# Patient Record
Sex: FEMALE | Race: WHITE | NOT HISPANIC OR LATINO | URBAN - METROPOLITAN AREA
[De-identification: names, ages, dates, MRNs, and addresses within clinical notes are randomized per-mention and may not be internally consistent; named-entity substitution may affect disease eponyms.]

---

## 2020-05-04 DIAGNOSIS — Z01.818 PRE-OP EXAM: ICD-10-CM

## 2020-05-04 PROCEDURE — U0003 INFECTIOUS AGENT DETECTION BY NUCLEIC ACID (DNA OR RNA); SEVERE ACUTE RESPIRATORY SYNDROME CORONAVIRUS 2 (SARS-COV-2) (CORONAVIRUS DISEASE [COVID-19]), AMPLIFIED PROBE TECHNIQUE, MAKING USE OF HIGH THROUGHPUT TECHNOLOGIES AS DESCRIBED BY CMS-2020-01-R: HCPCS

## 2020-05-04 RX ORDER — NORETHINDRONE ACETATE AND ETHINYL ESTRADIOL 1MG-20(21)
1 KIT ORAL EVERY EVENING
COMMUNITY

## 2020-05-05 DIAGNOSIS — Z01.818 PRE-OP EXAM: ICD-10-CM

## 2020-05-05 LAB — SARS-COV-2 RNA SPEC QL NAA+PROBE: NOT DETECTED

## 2020-05-05 PROCEDURE — U0003 INFECTIOUS AGENT DETECTION BY NUCLEIC ACID (DNA OR RNA); SEVERE ACUTE RESPIRATORY SYNDROME CORONAVIRUS 2 (SARS-COV-2) (CORONAVIRUS DISEASE [COVID-19]), AMPLIFIED PROBE TECHNIQUE, MAKING USE OF HIGH THROUGHPUT TECHNOLOGIES AS DESCRIBED BY CMS-2020-01-R: HCPCS

## 2020-05-06 LAB — SARS-COV-2 RNA SPEC QL NAA+PROBE: NOT DETECTED

## 2020-05-11 ENCOUNTER — ANESTHESIA EVENT (OUTPATIENT)
Dept: PERIOP | Facility: HOSPITAL | Age: 30
End: 2020-05-11
Payer: SELF-PAY

## 2020-05-12 ENCOUNTER — ANESTHESIA (OUTPATIENT)
Dept: PERIOP | Facility: HOSPITAL | Age: 30
End: 2020-05-12
Payer: SELF-PAY

## 2020-05-12 ENCOUNTER — HOSPITAL ENCOUNTER (OUTPATIENT)
Facility: HOSPITAL | Age: 30
Setting detail: OUTPATIENT SURGERY
Discharge: HOME | End: 2020-05-12
Attending: SURGERY | Admitting: SURGERY
Payer: SELF-PAY

## 2020-05-12 VITALS
WEIGHT: 116.4 LBS | DIASTOLIC BLOOD PRESSURE: 73 MMHG | SYSTOLIC BLOOD PRESSURE: 116 MMHG | OXYGEN SATURATION: 98 % | BODY MASS INDEX: 21.98 KG/M2 | RESPIRATION RATE: 16 BRPM | HEIGHT: 61 IN | TEMPERATURE: 99 F | HEART RATE: 73 BPM

## 2020-05-12 DIAGNOSIS — Z01.818 PRE-OP EXAM: Primary | ICD-10-CM

## 2020-05-12 PROBLEM — N64.81 BREAST PTOSIS: Status: ACTIVE | Noted: 2020-05-12

## 2020-05-12 LAB
EXT PREGNANCY TEST URINE: NEGATIVE
EXT. CONTROL: NORMAL

## 2020-05-12 PROCEDURE — 81025 URINE PREGNANCY TEST: CPT | Performed by: ANESTHESIOLOGY

## 2020-05-12 RX ORDER — CEFAZOLIN SODIUM 1 G/50ML
1000 SOLUTION INTRAVENOUS ONCE
Status: COMPLETED | OUTPATIENT
Start: 2020-05-12 | End: 2020-05-12

## 2020-05-12 RX ORDER — MAGNESIUM HYDROXIDE 1200 MG/15ML
LIQUID ORAL AS NEEDED
Status: DISCONTINUED | OUTPATIENT
Start: 2020-05-12 | End: 2020-05-12 | Stop reason: HOSPADM

## 2020-05-12 RX ORDER — SODIUM CHLORIDE 9 MG/ML
125 INJECTION, SOLUTION INTRAVENOUS CONTINUOUS
Status: DISCONTINUED | OUTPATIENT
Start: 2020-05-12 | End: 2020-05-12 | Stop reason: HOSPADM

## 2020-05-12 RX ORDER — NEOSTIGMINE METHYLSULFATE 1 MG/ML
INJECTION INTRAVENOUS AS NEEDED
Status: DISCONTINUED | OUTPATIENT
Start: 2020-05-12 | End: 2020-05-12 | Stop reason: SURG

## 2020-05-12 RX ORDER — FENTANYL CITRATE/PF 50 MCG/ML
50 SYRINGE (ML) INJECTION
Status: DISCONTINUED | OUTPATIENT
Start: 2020-05-12 | End: 2020-05-12 | Stop reason: HOSPADM

## 2020-05-12 RX ORDER — ONDANSETRON 2 MG/ML
8 INJECTION INTRAMUSCULAR; INTRAVENOUS ONCE
Status: DISCONTINUED | OUTPATIENT
Start: 2020-05-12 | End: 2020-05-12 | Stop reason: HOSPADM

## 2020-05-12 RX ORDER — HYDROCODONE BITARTRATE AND ACETAMINOPHEN 5; 325 MG/1; MG/1
2 TABLET ORAL EVERY 4 HOURS PRN
Status: DISCONTINUED | OUTPATIENT
Start: 2020-05-12 | End: 2020-05-12 | Stop reason: HOSPADM

## 2020-05-12 RX ORDER — ONDANSETRON 2 MG/ML
4 INJECTION INTRAMUSCULAR; INTRAVENOUS ONCE AS NEEDED
Status: DISCONTINUED | OUTPATIENT
Start: 2020-05-12 | End: 2020-05-12 | Stop reason: HOSPADM

## 2020-05-12 RX ORDER — LIDOCAINE HYDROCHLORIDE 10 MG/ML
INJECTION, SOLUTION EPIDURAL; INFILTRATION; INTRACAUDAL; PERINEURAL AS NEEDED
Status: DISCONTINUED | OUTPATIENT
Start: 2020-05-12 | End: 2020-05-12 | Stop reason: SURG

## 2020-05-12 RX ORDER — BUPIVACAINE HYDROCHLORIDE AND EPINEPHRINE 2.5; 5 MG/ML; UG/ML
INJECTION, SOLUTION EPIDURAL; INFILTRATION; INTRACAUDAL; PERINEURAL AS NEEDED
Status: DISCONTINUED | OUTPATIENT
Start: 2020-05-12 | End: 2020-05-12 | Stop reason: HOSPADM

## 2020-05-12 RX ORDER — SCOLOPAMINE TRANSDERMAL SYSTEM 1 MG/1
1 PATCH, EXTENDED RELEASE TRANSDERMAL ONCE AS NEEDED
Status: DISCONTINUED | OUTPATIENT
Start: 2020-05-12 | End: 2020-05-12 | Stop reason: HOSPADM

## 2020-05-12 RX ORDER — PROPOFOL 10 MG/ML
INJECTION, EMULSION INTRAVENOUS AS NEEDED
Status: DISCONTINUED | OUTPATIENT
Start: 2020-05-12 | End: 2020-05-12 | Stop reason: SURG

## 2020-05-12 RX ORDER — ROCURONIUM BROMIDE 10 MG/ML
INJECTION, SOLUTION INTRAVENOUS AS NEEDED
Status: DISCONTINUED | OUTPATIENT
Start: 2020-05-12 | End: 2020-05-12 | Stop reason: SURG

## 2020-05-12 RX ORDER — DEXAMETHASONE SODIUM PHOSPHATE 10 MG/ML
INJECTION, SOLUTION INTRAMUSCULAR; INTRAVENOUS AS NEEDED
Status: DISCONTINUED | OUTPATIENT
Start: 2020-05-12 | End: 2020-05-12 | Stop reason: SURG

## 2020-05-12 RX ORDER — MIDAZOLAM HYDROCHLORIDE 2 MG/2ML
INJECTION, SOLUTION INTRAMUSCULAR; INTRAVENOUS AS NEEDED
Status: DISCONTINUED | OUTPATIENT
Start: 2020-05-12 | End: 2020-05-12 | Stop reason: SURG

## 2020-05-12 RX ORDER — HYDROCODONE BITARTRATE AND ACETAMINOPHEN 5; 325 MG/1; MG/1
1 TABLET ORAL EVERY 4 HOURS PRN
Status: DISCONTINUED | OUTPATIENT
Start: 2020-05-12 | End: 2020-05-12 | Stop reason: HOSPADM

## 2020-05-12 RX ORDER — ONDANSETRON 2 MG/ML
INJECTION INTRAMUSCULAR; INTRAVENOUS AS NEEDED
Status: DISCONTINUED | OUTPATIENT
Start: 2020-05-12 | End: 2020-05-12 | Stop reason: SURG

## 2020-05-12 RX ORDER — GLYCOPYRROLATE 0.2 MG/ML
INJECTION INTRAMUSCULAR; INTRAVENOUS AS NEEDED
Status: DISCONTINUED | OUTPATIENT
Start: 2020-05-12 | End: 2020-05-12 | Stop reason: SURG

## 2020-05-12 RX ORDER — FENTANYL CITRATE 50 UG/ML
INJECTION, SOLUTION INTRAMUSCULAR; INTRAVENOUS AS NEEDED
Status: DISCONTINUED | OUTPATIENT
Start: 2020-05-12 | End: 2020-05-12 | Stop reason: SURG

## 2020-05-12 RX ADMIN — LIDOCAINE HYDROCHLORIDE 60 MG: 10 INJECTION, SOLUTION EPIDURAL; INFILTRATION; INTRACAUDAL; PERINEURAL at 10:30

## 2020-05-12 RX ADMIN — PROPOFOL 200 MG: 10 INJECTION, EMULSION INTRAVENOUS at 10:30

## 2020-05-12 RX ADMIN — CEFAZOLIN SODIUM 1000 MG: 1 SOLUTION INTRAVENOUS at 10:26

## 2020-05-12 RX ADMIN — MIDAZOLAM 2 MG: 1 INJECTION INTRAMUSCULAR; INTRAVENOUS at 10:21

## 2020-05-12 RX ADMIN — ROCURONIUM BROMIDE 50 MG: 10 INJECTION, SOLUTION INTRAVENOUS at 10:30

## 2020-05-12 RX ADMIN — SODIUM CHLORIDE 125 ML/HR: 0.9 INJECTION, SOLUTION INTRAVENOUS at 08:25

## 2020-05-12 RX ADMIN — FENTANYL CITRATE 50 MCG: 50 INJECTION, SOLUTION INTRAMUSCULAR; INTRAVENOUS at 10:30

## 2020-05-12 RX ADMIN — NEOSTIGMINE METHYLSULFATE 1.5 MG: 1 INJECTION, SOLUTION INTRAVENOUS at 12:34

## 2020-05-12 RX ADMIN — FENTANYL CITRATE 50 MCG: 50 INJECTION, SOLUTION INTRAMUSCULAR; INTRAVENOUS at 11:48

## 2020-05-12 RX ADMIN — ONDANSETRON 4 MG: 2 INJECTION INTRAMUSCULAR; INTRAVENOUS at 12:37

## 2020-05-12 RX ADMIN — ONDANSETRON 4 MG: 2 INJECTION INTRAMUSCULAR; INTRAVENOUS at 10:51

## 2020-05-12 RX ADMIN — SCOPALAMINE 1 PATCH: 1 PATCH, EXTENDED RELEASE TRANSDERMAL at 08:17

## 2020-05-12 RX ADMIN — NEOSTIGMINE METHYLSULFATE 1.5 MG: 1 INJECTION, SOLUTION INTRAVENOUS at 12:39

## 2020-05-12 RX ADMIN — FENTANYL CITRATE 50 MCG: 50 INJECTION, SOLUTION INTRAMUSCULAR; INTRAVENOUS at 10:24

## 2020-05-12 RX ADMIN — HYDROCODONE BITARTRATE AND ACETAMINOPHEN 1 TABLET: 5; 325 TABLET ORAL at 14:18

## 2020-05-12 RX ADMIN — FENTANYL CITRATE 50 MCG: 50 INJECTION, SOLUTION INTRAMUSCULAR; INTRAVENOUS at 13:34

## 2020-05-12 RX ADMIN — GLYCOPYRROLATE 0.3 MG: 0.2 INJECTION INTRAMUSCULAR; INTRAVENOUS at 12:34

## 2020-05-12 RX ADMIN — FENTANYL CITRATE 50 MCG: 50 INJECTION, SOLUTION INTRAMUSCULAR; INTRAVENOUS at 13:27

## 2020-05-12 RX ADMIN — GLYCOPYRROLATE 0.3 MG: 0.2 INJECTION INTRAMUSCULAR; INTRAVENOUS at 12:39

## 2020-05-12 RX ADMIN — SODIUM CHLORIDE: 0.9 INJECTION, SOLUTION INTRAVENOUS at 12:33

## 2020-05-12 RX ADMIN — FENTANYL CITRATE 50 MCG: 50 INJECTION, SOLUTION INTRAMUSCULAR; INTRAVENOUS at 10:49

## 2020-05-12 RX ADMIN — DEXAMETHASONE SODIUM PHOSPHATE 8 MG: 10 INJECTION, SOLUTION INTRAMUSCULAR; INTRAVENOUS at 10:51

## 2021-01-18 ENCOUNTER — IMMUNIZATIONS (OUTPATIENT)
Dept: FAMILY MEDICINE CLINIC | Facility: HOSPITAL | Age: 31
End: 2021-01-18

## 2021-01-18 DIAGNOSIS — Z23 ENCOUNTER FOR IMMUNIZATION: Primary | ICD-10-CM

## 2021-01-18 PROCEDURE — 91301 SARS-COV-2 / COVID-19 MRNA VACCINE (MODERNA) 100 MCG: CPT

## 2021-01-18 PROCEDURE — 0011A SARS-COV-2 / COVID-19 MRNA VACCINE (MODERNA) 100 MCG: CPT

## 2021-01-19 ENCOUNTER — TELEPHONE (OUTPATIENT)
Dept: PEDIATRICS CLINIC | Facility: CLINIC | Age: 31
End: 2021-01-19

## 2021-02-17 ENCOUNTER — IMMUNIZATIONS (OUTPATIENT)
Dept: FAMILY MEDICINE CLINIC | Facility: HOSPITAL | Age: 31
End: 2021-02-17

## 2021-02-17 DIAGNOSIS — Z23 ENCOUNTER FOR IMMUNIZATION: Primary | ICD-10-CM

## 2021-02-17 PROCEDURE — 91301 SARS-COV-2 / COVID-19 MRNA VACCINE (MODERNA) 100 MCG: CPT

## 2021-02-17 PROCEDURE — 0012A SARS-COV-2 / COVID-19 MRNA VACCINE (MODERNA) 100 MCG: CPT

## 2021-06-28 ENCOUNTER — OFFICE VISIT (OUTPATIENT)
Dept: URGENT CARE | Facility: CLINIC | Age: 31
End: 2021-06-28
Payer: COMMERCIAL

## 2021-06-28 VITALS
TEMPERATURE: 97.8 F | HEIGHT: 61 IN | WEIGHT: 113 LBS | RESPIRATION RATE: 18 BRPM | BODY MASS INDEX: 21.34 KG/M2 | OXYGEN SATURATION: 99 % | HEART RATE: 64 BPM

## 2021-06-28 DIAGNOSIS — J06.9 VIRAL URI: Primary | ICD-10-CM

## 2021-06-28 DIAGNOSIS — R05.9 COUGH: ICD-10-CM

## 2021-06-28 PROCEDURE — 99213 OFFICE O/P EST LOW 20 MIN: CPT | Performed by: PHYSICIAN ASSISTANT

## 2021-06-28 RX ORDER — BENZONATATE 200 MG/1
200 CAPSULE ORAL 3 TIMES DAILY PRN
Qty: 20 CAPSULE | Refills: 0 | Status: SHIPPED | OUTPATIENT
Start: 2021-06-28

## 2021-06-28 RX ORDER — FLUTICASONE PROPIONATE 50 MCG
2 SPRAY, SUSPENSION (ML) NASAL DAILY
Qty: 16 G | Refills: 0 | Status: SHIPPED | OUTPATIENT
Start: 2021-06-28

## 2021-06-28 NOTE — PROGRESS NOTES
[facility] [facility]        NAME: Stan Santoro is a 33 y o  female  : 1990    MRN: [id number]  DATE: 2021  TIME: 9:14 AM    Assessment and Plan   Viral URI [J06 9]  1  Viral URI  benzonatate (TESSALON) 200 MG capsule    fluticasone (FLONASE) 50 mcg/act nasal spray   2  Cough  benzonatate (TESSALON) 200 MG capsule         Patient Instructions     Discussed condition with pt  I suspect viral URI for which I prescribed her Flonase and Tessalon Perles and rec hydration, rest, discussed OTC cough/cold meds, and observation  Should be reevaluated if condition persists/worsens  Follow up with PCP in 3-5 days  Proceed to  ER if symptoms worsen  Chief Complaint     Chief Complaint   Patient presents with    Cough     started with sore throat on friday, clearing throat alot still, started with cough over the weekend, pt is NOT in distress  no fevers or other cold symptoms, was exposed to a child with a cough, no other sick contacts, no meds at this time, pt is FULLY vaccinated         History of Present Illness       Pt presents with a 3 day history of ST, PND, cough  Denies fever, chills, congestion, N/V/D, known exposure to COVID-19, and pt is fully vaccinated  Denies asthma/allergies  Does not smoke  Review of Systems   Review of Systems   Constitutional: Negative  HENT: Positive for postnasal drip and sore throat  Negative for congestion and ear pain  Respiratory: Positive for cough  Negative for shortness of breath  Cardiovascular: Negative  Gastrointestinal: Negative  Genitourinary: Negative            Current Medications       Current Outpatient Medications:     Multiple Vitamin (MULTI-VITAMIN PO), Take by mouth, Disp: , Rfl:     norethindrone-ethinyl estradiol (JUNEL FE 1/20) 1-20 MG-MCG per tablet, Take 1 tablet by mouth every evening, Disp: , Rfl:     benzonatate (TESSALON) 200 MG capsule, Take 1 capsule (200 mg total) by mouth 3 (three) times a day as needed for cough, Disp: 20 capsule, Rfl: 0    fluticasone (FLONASE) 50 mcg/act nasal spray, 2 sprays into each nostril daily, Disp: 16 g, Rfl: 0    Current Allergies     Allergies as of 2021 - Reviewed 2021   Allergen Reaction Noted    Bactrim [sulfamethoxazole-trimethoprim] Hives 2020            The following portions of the patient's history were reviewed and updated as appropriate: allergies, current medications, past family history, past medical history, past social history, past surgical history and problem list      Past Medical History:   Diagnosis Date    Exercises 5 to 6 times per week     PONV (postoperative nausea and vomiting)     with C Sections//and dizziness    Wears glasses     and contacts       Past Surgical History:   Procedure Laterality Date     SECTION      x2    UT SUSPENSION OF BREAST Bilateral 2020    Procedure: BREAST MASTOPEXY;  Surgeon: Meka Waller MD;  Location: [facility];  Service: Plastics    WISDOM TOOTH EXTRACTION         History reviewed  No pertinent family history  Medications have been verified  Objective   Pulse 64   Temp 97 8 °F (36 6 °C) (Tympanic)   Resp 18   Ht 5' 1" (1 549 m)   Wt 51 3 kg (113 lb)   SpO2 99%   BMI 21 35 kg/m²   No LMP recorded  Physical Exam     Physical Exam  Vitals reviewed  Constitutional:       General: She is not in acute distress  Appearance: She is well-developed  HENT:      Right Ear: Hearing, tympanic membrane, ear canal and external ear normal       Left Ear: Hearing, tympanic membrane, ear canal and external ear normal       Nose: Nose normal       Mouth/Throat:      Mouth: Mucous membranes are moist       Pharynx: Posterior oropharyngeal erythema (PND) present  No oropharyngeal exudate  Tonsils: No tonsillar exudate  Cardiovascular:      Rate and Rhythm: Normal rate and regular rhythm  Pulses: Normal pulses  Heart sounds: Normal heart sounds  No murmur heard  Pulmonary:      Effort: Pulmonary effort is normal  No respiratory distress  Breath sounds: Normal breath sounds  Musculoskeletal:      Cervical back: Neck supple  Lymphadenopathy:      Cervical: No cervical adenopathy  Neurological:      Mental Status: She is alert and oriented to person, place, and time

## 2021-06-28 NOTE — PATIENT INSTRUCTIONS
Upper Respiratory Infection   WHAT YOU NEED TO KNOW:   An upper respiratory infection is also called a cold  It can affect your nose, throat, ears, and sinuses  Cold symptoms are usually worst for the first 3 to 5 days  Most people get better in 7 to 14 days  You may continue to cough for 2 to 3 weeks  Colds are caused by viruses and do not get better with antibiotics  DISCHARGE INSTRUCTIONS:   Call your local emergency number (911 in the [address] [facility] [address]) if:   · You have chest pain or trouble breathing  Return to the emergency department if:   · You have a fever over 102ºF (39ºC)  Call your doctor if:   · You have a low fever  · Your sore throat gets worse or you see white or yellow spots in your throat  · Your symptoms get worse after 3 to 5 days or are not better in 14 days  · You have a rash anywhere on your skin  · You have large, tender lumps in your neck  · You have thick, green, or yellow drainage from your nose  · You cough up thick yellow, green, or bloody mucus  · You have a bad earache  · You have questions or concerns about your condition or care  Medicines: You may need any of the following:  · Decongestants  help reduce nasal congestion and help you breathe more easily  If you take decongestant pills, they may make you feel restless or cause problems with your sleep  Do not use decongestant sprays for more than a few days  · Cough suppressants  help reduce coughing  Ask your healthcare provider which type of cough medicine is best for you  · NSAIDs , such as ibuprofen, help decrease swelling, pain, and fever  NSAIDs can cause stomach bleeding or kidney problems in certain people  If you take blood thinner medicine, always ask your healthcare provider if NSAIDs are safe for you  Always read the medicine label and follow directions  · Acetaminophen  decreases pain and fever  It is available without a doctor's order  Ask how much to take and how often to take it  Follow directions  Read the labels of all other medicines you are using to see if they also contain acetaminophen, or ask your doctor or pharmacist  Acetaminophen can cause liver damage if not taken correctly  Do not use more than 4 grams (4,000 milligrams) total of acetaminophen in one day  · Take your medicine as directed  Contact your healthcare provider if you think your medicine is not helping or if you have side effects  Tell him or her if you are allergic to any medicine  Keep a list of the medicines, vitamins, and herbs you take  Include the amounts, and when and why you take them  Bring the list or the pill bottles to follow-up visits  Carry your medicine list with you in case of an emergency  Self-care:   · Rest as much as possible  Slowly start to do more each day  · Drink more liquids as directed  Liquids will help thin and loosen mucus so you can cough it up  Liquids will also help prevent dehydration  Liquids that help prevent dehydration include water, fruit juice, and broth  Do not drink liquids that contain caffeine  Caffeine can increase your risk for dehydration  Ask your healthcare provider how much liquid to drink each day  · Soothe a sore throat  Gargle with warm salt water  Make salt water by dissolving ¼ teaspoon salt in 1 cup warm water  You may also suck on hard candy or throat lozenges  You may use a sore throat spray  · Use a humidifier or vaporizer  Use a cool mist humidifier or a vaporizer to increase air moisture in your home  This may make it easier for you to breathe and help decrease your cough  · Use saline nasal drops as directed  These help relieve congestion  · Apply petroleum-based jelly around the outside of your nostrils  This can decrease irritation from blowing your nose  · Do not smoke  Nicotine and other chemicals in cigarettes and cigars can make your symptoms worse  They can also cause infections such as bronchitis or pneumonia   Ask your healthcare provider for information if you currently smoke and need help to quit  E-cigarettes or smokeless tobacco still contain nicotine  Talk to your healthcare provider before you use these products  Prevent a cold:   · Wash your hands often  Use soap and water every time you wash your hands  Rub your soapy hands together, lacing your fingers  Use the fingers of one hand to scrub under the nails of the other hand  Wash for at least 20 seconds  Rinse with warm, running water for several seconds  Then dry your hands  Use germ-killing gel if soap and water are not available  Do not touch your eyes or mouth without washing your hands first          · Cover a sneeze or cough  Use a tissue that covers your mouth and nose  Put the used tissue in the trash right away  Use the bend of your arm if a tissue is not available  Wash your hands well with soap and water or use a hand   Do not stand close to anyone who is sneezing or coughing  · Try to stay away from others while you are sick  This is especially important during the first 2 to 3 days when the virus is more easily spread  Wait until a fever, cough, or other symptoms are gone before you return to work or other regular activities  · Do not share items while you are sick  This includes food, drinks, eating utensils, and dishes  Follow up with your doctor as directed:  Write down your questions so you remember to ask them during your visits  © Copyright [address] Information is for End User's use only and may not be sold, redistributed or otherwise used for commercial purposes  All illustrations and images included in CareNotes® are the copyrighted property of A D A M , Inc  or [facility] Katja Card   The above information is an  only  It is not intended as medical advice for individual conditions or treatments   Talk to your doctor, nurse or pharmacist before following any medical regimen to see if it is safe and effective for you

## 2024-09-09 ENCOUNTER — TELEPHONE (OUTPATIENT)
Dept: OBGYN CLINIC | Facility: MEDICAL CENTER | Age: 34
End: 2024-09-09

## 2024-09-09 NOTE — TELEPHONE ENCOUNTER
Patient called back and is seeing Dr. Mathew for rt shoulder and no imaging done yet - added to notes

## 2024-09-09 NOTE — TELEPHONE ENCOUNTER
Left message stating to please call the office with the reason for the visit (left or right shoulder) , please bring any imaging and report along to the visit. Left office number for Pt to return call.

## 2024-09-10 ENCOUNTER — OFFICE VISIT (OUTPATIENT)
Dept: OBGYN CLINIC | Facility: MEDICAL CENTER | Age: 34
End: 2024-09-10
Payer: COMMERCIAL

## 2024-09-10 ENCOUNTER — APPOINTMENT (OUTPATIENT)
Dept: RADIOLOGY | Facility: MEDICAL CENTER | Age: 34
End: 2024-09-10
Payer: COMMERCIAL

## 2024-09-10 VITALS
SYSTOLIC BLOOD PRESSURE: 109 MMHG | WEIGHT: 112 LBS | DIASTOLIC BLOOD PRESSURE: 73 MMHG | HEIGHT: 61 IN | BODY MASS INDEX: 21.14 KG/M2 | HEART RATE: 61 BPM

## 2024-09-10 DIAGNOSIS — S43.431A LABRAL TEAR OF SHOULDER, RIGHT, INITIAL ENCOUNTER: ICD-10-CM

## 2024-09-10 DIAGNOSIS — M75.41 SHOULDER IMPINGEMENT SYNDROME, RIGHT: Primary | ICD-10-CM

## 2024-09-10 DIAGNOSIS — M25.511 RIGHT SHOULDER PAIN, UNSPECIFIED CHRONICITY: ICD-10-CM

## 2024-09-10 PROCEDURE — 99203 OFFICE O/P NEW LOW 30 MIN: CPT | Performed by: ORTHOPAEDIC SURGERY

## 2024-09-10 PROCEDURE — 73030 X-RAY EXAM OF SHOULDER: CPT

## 2024-09-10 NOTE — PROGRESS NOTES
Ortho Sports Medicine Shoulder New Patient Visit     Assesment:   34 y.o. female right shoulder possible labral tear with shoulder impingement     Plan:    Conservative treatment:    Ice to shoulder 1-2 times daily, for 20 minutes at a time.  PT for ROM and strengthening to shoulder, rotator cuff, scapular stabilizers.      Imaging:    We will obtain an MRI arthrogram of the shoulder to rule out labral vs small cuff tear.      Injection:    No Injection planned at this time.      Surgery:     No surgery is recommended at this point, continue with conservative treatment plan as noted.      Follow up:    No follow-ups on file.        Chief Complaint   Patient presents with    Right Shoulder - Pain       History of Present Illness:    The patient is a 34 y.o., right hand dominant female whose occupation is unknown, referred to me by their primary care physician, seen in clinic for consultation of right shoulder pain.      The patient denies a history of diabetes.  The patient denies a history of thyroid disorder.      Pain is located posterior, lateral.  The patient rates the pain as a 8/10.  The pain has been present for 6 months.      The patient denies specific right shoulder injury but states that she has been noticed some intermittent lateral aspect right shoulder pain since March.  Patient states that she does  her daughters field hockey/softball team and does state that she was throwing a ball around and did feel some pain after that time.  Patient states that she was able to nurse the shoulder back in she was doing well.  Patient reports that after that time she had full range of motion and strength.  Patient reports that then in May 2024 she was throwing a waterlogged softball and when she felt a pop within the shoulder.  Patient reports that after that moment she was able to range the shoulder but states that she has had pain ever since.  Patient reports all of her pain is deep within the shoulder but it  does radiate to the lateral and posterior aspect of the shoulder.  Patient also notes intermittent clicking within the shoulder with ROM since the injury. She also notes pain at end that will wake her form sleep.  The pain is characterized as sharp, stabbing, dull, achy.  The pain is present at all times.      Pain is improved by rest.  Pain is aggravated by overhead activity, reaching back, rotation, lifting , and exercising.    Symptoms include clicking and catching.    The patient denies weakness.  The patient denies numbness and tingling.     The patient has tried rest, ice, and NSAIDS.          Shoulder Surgical History:  None    Past Medical, Social and Family History:  Past Medical History:   Diagnosis Date    Exercises 5 to 6 times per week     PONV (postoperative nausea and vomiting)     with C Sections//and dizziness    Wears glasses     and contacts     Past Surgical History:   Procedure Laterality Date     SECTION      x2    VT MASTOPEXY Bilateral 2020    Procedure: BREAST MASTOPEXY;  Surgeon: Kashif Grande MD;  Location: [facility];  Service: Plastics    WISDOM TOOTH EXTRACTION       Allergies   Allergen Reactions    Bactrim [Sulfamethoxazole-Trimethoprim] Hives     Current Outpatient Medications on File Prior to Visit   Medication Sig Dispense Refill    Multiple Vitamin (MULTI-VITAMIN PO) Take by mouth      benzonatate (TESSALON) 200 MG capsule Take 1 capsule (200 mg total) by mouth 3 (three) times a day as needed for cough (Patient not taking: Reported on 9/10/2024) 20 capsule 0    fluticasone (FLONASE) 50 mcg/act nasal spray 2 sprays into each nostril daily (Patient not taking: Reported on 9/10/2024) 16 g 0    norethindrone-ethinyl estradiol (JUNEL FE 1/20) 1-20 MG-MCG per tablet Take 1 tablet by mouth every evening (Patient not taking: Reported on 9/10/2024)       No current facility-administered medications on file prior to visit.     Social History     Socioeconomic History    Marital  "status: /Civil Union     Spouse name: Not on file    Number of children: Not on file    Years of education: Not on file    Highest education level: Not on file   Occupational History    Not on file   Tobacco Use    Smoking status: Never    Smokeless tobacco: Never   Vaping Use    Vaping status: Never Used   Substance and Sexual Activity    Alcohol use: Yes     Comment: minimal    Drug use: Never    Sexual activity: Not on file   Other Topics Concern    Not on file   Social History Narrative    Not on file     Social Determinants of Health     Financial Resource Strain: Not on file   Food Insecurity: Not on file   Transportation Needs: Not on file   Physical Activity: Not on file   Stress: Not on file   Social Connections: Not on file   Intimate Partner Violence: Not on file   Housing Stability: Not on file         I have reviewed the past medical, surgical, social and family history, medications and allergies as documented in the EMR.    Review of systems: ROS is negative other than that noted in the HPI.  Constitutional: Negative for fatigue and fever.   HENT: Negative for sore throat.    Respiratory: Negative for shortness of breath.    Cardiovascular: Negative for chest pain.   Gastrointestinal: Negative for abdominal pain.   Endocrine: Negative for cold intolerance and heat intolerance.   Genitourinary: Negative for flank pain.   Musculoskeletal: Negative for back pain.   Skin: Negative for rash.   Allergic/Immunologic: Negative for immunocompromised state.   Neurological: Negative for dizziness.   Psychiatric/Behavioral: Negative for agitation.      Physical Exam:    Blood pressure 109/73, pulse 61, height 5' 1\" (1.549 m), weight 50.8 kg (112 lb), not currently breastfeeding.    General/Constitutional: NAD, well developed, well nourished  HENT: Normocephalic, atraumatic  CV: Intact distal pulses, regular rate  Resp: No respiratory distress or labored breathing  GI: Soft and non-tender   Lymphatic: No " lymphadenopathy palpated  Neuro: Alert and Oriented x 3, no focal deficits  Psych: Normal mood, normal affect, normal judgement, normal behavior  Skin: Warm, dry, no rashes, no erythema      Shoulder focused exam:       RIGHT LEFT    Scapula Atrophy Negative Negative     Winging Negative Negative     Protraction Negative Negative    Rotator cuff SS 5/5 5/5     IS 5/5 5/5     SubS 5/5 5/5    ROM     170     ER0 60 60                   IRb T6    T6    TTP: AC Negative Negative     Biceps Positive Negative     Coracoid Negative Negative    Special Tests: O'Briens Positive Negative     Lazo-shear Positive Negative     Cross body Adduction Negative Negative     Speeds  Negative Negative     Alfred's Negative Negative     Whipple Negative Negative       Neer Negative Negative     Amor Negative Negative    Instability: Apprehension & relocation not tested not tested     Load & shift not tested not tested    Other: Crank Negative Negative                 UE NV Exam: +2 Radial pulses bilaterally  Sensation intact to light touch C5-T1 bilaterally, Radial/median/ulnar nerve motor intact      Bilateral elbow, wrist, and and forearm ROM full, painless with passive ROM, no ttp or crepitance throughout extremities below shoulder joint    Cervical ROM is full without pain, numbness or tingling      Shoulder Imaging    X-rays of the right shoulder were reviewed, which demonstrate mild AC joint OA.  I have reviewed the radiology report and do not currently have a radiology reading from [facility], but will check the result once the reading is performed.

## 2024-09-16 ENCOUNTER — EVALUATION (OUTPATIENT)
Dept: PHYSICAL THERAPY | Facility: CLINIC | Age: 34
End: 2024-09-16
Payer: COMMERCIAL

## 2024-09-16 DIAGNOSIS — M75.41 SHOULDER IMPINGEMENT SYNDROME, RIGHT: ICD-10-CM

## 2024-09-16 PROCEDURE — 97110 THERAPEUTIC EXERCISES: CPT | Performed by: PHYSICAL THERAPIST

## 2024-09-16 PROCEDURE — 97161 PT EVAL LOW COMPLEX 20 MIN: CPT | Performed by: PHYSICAL THERAPIST

## 2024-09-16 NOTE — PROGRESS NOTES
"PT Evaluation     Today's date: 2024  Patient name: Isha Jacobo  : 1990  MRN: [id number]  Referring provider: Linsey Mims,*  Dx:   Encounter Diagnosis     ICD-10-CM    1. Shoulder impingement syndrome, right  M75.41 Ambulatory Referral to Physical Therapy                     Assessment  Functional limitations: limited overhead lifting, interrupted sleep    Assessment details: Isha Jacobo is a 33 yo female with right posterior shoulder pain that she describes as \"deep inside the joint\" worsening after throwing a water logged ball several months ago. She presents with decreased strength, pain at end range motion, and the listed functional limitations. She is scheduled for an arthrogram on 10/9/24 to rule out suspected labral tear. She is a good candidate for OPPT to address above impairments and optimize functional status.     Goals  6 weeks  1. Independent with HEP at discharge  2. Tolerate sleeping 6-8 hours uninterrupted by pain to allow resuming PLOF  3. Normalize R shoulder strength throughout to allow full functional use of RUE.  4. Achieve full pain free AROM R shoulder at all planes to allow full functional use of RUE.          Plan  Patient would benefit from: skilled physical therapy and PT eval  Other planned modality interventions: EPAT    Planned therapy interventions: manual therapy, neuromuscular re-education, patient/caregiver education, therapeutic activities, therapeutic exercise and home exercise program    Frequency: 1x week  Duration in weeks: 6  Treatment plan discussed with: patient  Plan details: Provided with and reviewed initial written HEP.  Reviewed physical exam findings and plan of care.  All questions answered to patient's satisfaction.          Subjective Evaluation    History of Present Illness  Mechanism of injury: Patient has been having right shoulder pain for several months after throwing a water logged ball over memorial day weekend. She felt immediate " pain. Has been popping a lot over the last few weeks. Currently limiting overhead lifting when working out. Saw Dr. Mathew in ortho, referred to OPPT. Suspected labral tear and impingement syndrome.   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     Cervical/Thoracic Screen   Cervical range of motion within normal limits  Thoracic range of motion within normal limits with the following exceptions: Increased thoracic kyphosis    Active Range of Motion     Right Shoulder   Flexion: WFL and with pain  Abduction: WFL and with pain  External rotation BTH: WFL  Internal rotation BTB: WFL and with pain    Passive Range of Motion     Right Shoulder   Flexion: 165 degrees with pain  Abduction: 155 degrees with pain  External rotation 90°: WFL  Internal rotation 90°: 60 degrees     Joint Play     Right Shoulder  Joints within functional limits are the anterior capsule, posterior capsule and inferior capsule. Hypomobile in the thoracic spine.     Strength/Myotome Testing     Right Shoulder     Planes of Motion   External rotation at 0°: 4   External rotation at 90°: 4+   Internal rotation at 0°: 4     Isolated Muscles   Lower trapezius: 3+   Middle trapezius: 4+   Serratus anterior: 5     Tests     Right Shoulder   Positive empty can, Hawkin's and Neer's.   Negative Speed's.     HEP provided as follows:  Access Code: [email or web address]  URL: [email or web address]  Date: 2024  Prepared by: Jessica Carranza    Exercises  - Standing Shoulder Internal Rotation Stretch with Towel  - 1 x daily - 7 x weekly - 3 reps - 30 hold  -  Thoracic Extension Mobilization on Foam Roll  - 1 x daily - 7 x weekly - 10 reps - 10 hold  - Seated Cervical and Thoracic Extension Stretch  - 1 x daily - 7 x weekly - 10 reps - 10 hold  - Thoracic Cervical Extension on Swiss Ball  - 1 x daily - 7 x weekly - 10 reps - 10 hold  - Shoulder External Rotation and Scapular Retraction with Resistance  - 1 x daily - 7 x weekly - 3 sets - 10 reps  - Standing Single Arm Shoulder External Rotation in Abduction with Anchored Resistance  - 1 x daily - 7 x weekly - 3 sets - 10 reps  - Standing Lat Pull Down with Resistance - Elbows Bent  - 1 x daily - 7 x weekly - 3 sets - 10 reps  - Prone Scapular Retraction Y  - 1 x daily - 7 x weekly - 3 sets - 10 reps  - Prone W Scapular Retraction  - 1 x daily - 7 x weekly - 3 sets - 10 reps         Precautions: arthrogram scheduled for 10/9/24      Manuals 9/16            EPAT R post shld DI15 3.0 barr 15Hz #1                                                   Neuro Re-Ed                                                                                                        Ther Ex             B ER tband             Lat pull down tband             ER at 90 tband             Towel IR str             T/S ext over foam roller             Prone WT                          Pt ed HEP rev KT            Ther Activity                                       Gait Training                                       Modalities

## 2024-09-19 ENCOUNTER — APPOINTMENT (OUTPATIENT)
Dept: PHYSICAL THERAPY | Facility: CLINIC | Age: 34
End: 2024-09-19
Payer: COMMERCIAL

## 2024-09-23 ENCOUNTER — OFFICE VISIT (OUTPATIENT)
Dept: PHYSICAL THERAPY | Facility: CLINIC | Age: 34
End: 2024-09-23
Payer: COMMERCIAL

## 2024-09-23 DIAGNOSIS — M75.41 SHOULDER IMPINGEMENT SYNDROME, RIGHT: Primary | ICD-10-CM

## 2024-09-23 PROCEDURE — 97110 THERAPEUTIC EXERCISES: CPT | Performed by: PHYSICAL THERAPIST

## 2024-09-23 PROCEDURE — 97140 MANUAL THERAPY 1/> REGIONS: CPT | Performed by: PHYSICAL THERAPIST

## 2024-09-23 NOTE — PROGRESS NOTES
"Daily Note     Today's date: 2024  Patient name: Isha Jacobo  : 1990  MRN: [id number]  Referring provider: Linsey Mims,*  Dx:   Encounter Diagnosis     ICD-10-CM    1. Shoulder impingement syndrome, right  M75.41                      Subjective: Pt reports feeling looser with the exercises but does notice the towel IR stretch is painful.       Objective: See treatment diary below      Assessment: Tolerated treatment well. Patient exhibited good technique with therapeutic exercises and would benefit from continued PT. Recommended pt avoid cold pack or NSAIDs after EPAT treatment to avoid diminished benefit.       Plan: Continue per plan of care.      Precautions: arthrogram scheduled for 10/9/24      Manuals            EPAT R post shld DI15 3.0 barr 15Hz #1 3.5-4.0 barr #2                                                  Neuro Re-Ed                                                                                                        Ther Ex             B ER tband  2x15 blue           Lat pull down tband  2x15 black           ER at 90 tband  2x15 blue           Towel IR str             T/S ext over foam roller  10\"x10           Prone WT  2x15 ea           Sleeper str  30\"x3           Pt ed HEP rev KT            Ther Activity                                       Gait Training                                       Modalities                                            "

## 2024-09-30 ENCOUNTER — OFFICE VISIT (OUTPATIENT)
Dept: PHYSICAL THERAPY | Facility: CLINIC | Age: 34
End: 2024-09-30
Payer: COMMERCIAL

## 2024-09-30 DIAGNOSIS — M75.41 SHOULDER IMPINGEMENT SYNDROME, RIGHT: Primary | ICD-10-CM

## 2024-09-30 PROCEDURE — 97110 THERAPEUTIC EXERCISES: CPT | Performed by: PHYSICAL THERAPIST

## 2024-09-30 NOTE — PROGRESS NOTES
"Daily Note     Today's date: 2024  Patient name: Isha Jacobo  : 1990  MRN: [id number]  Referring provider: Linsey Mims,*  Dx:   Encounter Diagnosis     ICD-10-CM    1. Shoulder impingement syndrome, right  M75.41                      Subjective: Pt reports she's feeling a little crampy today in her shoulder. Feels the sidesleeper IR stretch is more tolerable than the towel ir stretch. Still having catching and popping.       Objective: See treatment diary below      Assessment: Tolerated treatment well. Patient exhibited good technique with therapeutic exercises      Plan: Continue per plan of care.      Precautions: arthrogram scheduled for 10/9/24      Manuals           EPAT R post shld DI15 3.0 barr 15Hz #1 3.5-4.0 barr #2 3.5 barr #3                                                  Neuro Re-Ed                                                                                                        Ther Ex             B ER tband  2x15 blue 2x15 blue           Lat pull down tband  2x15 black 2x15 black          ER at 90 tband  2x15 blue 2x15 blue          Towel IR str             T/S ext over foam roller  10\"x10 10\"x10          Prone WT  2x15 ea 2x15 ea          Sleeper str  30\"x3 30\"x3          Pt ed HEP rev KT            Ther Activity                                       Gait Training                                       Modalities                                              "

## 2024-10-07 ENCOUNTER — TELEPHONE (OUTPATIENT)
Dept: RADIOLOGY | Facility: HOSPITAL | Age: 34
End: 2024-10-07

## 2024-10-07 ENCOUNTER — OFFICE VISIT (OUTPATIENT)
Dept: PHYSICAL THERAPY | Facility: CLINIC | Age: 34
End: 2024-10-07
Payer: COMMERCIAL

## 2024-10-07 DIAGNOSIS — M75.41 SHOULDER IMPINGEMENT SYNDROME, RIGHT: Primary | ICD-10-CM

## 2024-10-07 PROCEDURE — 97140 MANUAL THERAPY 1/> REGIONS: CPT | Performed by: PHYSICAL THERAPIST

## 2024-10-07 PROCEDURE — 97110 THERAPEUTIC EXERCISES: CPT | Performed by: PHYSICAL THERAPIST

## 2024-10-07 NOTE — PROGRESS NOTES
"Daily Note     Today's date: 10/7/2024  Patient name: Isha Jacobo  : 1990  MRN: [id number]  Referring provider: Linsey Mims,*  Dx:   Encounter Diagnosis     ICD-10-CM    1. Shoulder impingement syndrome, right  M75.41                      Subjective: Pt reports she's feeling sore today. Doesn't notice any significant improvement with PT. Has arthrogram scheduled in 2 days.       Objective: See treatment diary below      Assessment: Tolerated treatment well. Patient exhibited good technique with therapeutic exercises      Plan:  Hold PT pending arthrogram results and follow up with ortho     Precautions: arthrogram scheduled for 10/9/24      Manuals 9/16 9/23 9/30 10/7         EPAT R post shld DI15 3.0 barr 15Hz #1 3.5-4.0 barr #2 3.5 barr #3  3.5 barr #4 2500 pulses                                                Neuro Re-Ed                                                                                                        Ther Ex             B ER tband  2x15 blue 2x15 blue  2x15 blue         Lat pull down tband  2x15 black 2x15 black 2x15 black         ER at 90 tband  2x15 blue 2x15 blue 2x15 blue         Towel IR str             T/S ext over foam roller  10\"x10 10\"x10 10\"x10         Prone WT  2x15 ea 2x15 ea 2x15 ea         Sleeper str  30\"x3 30\"x3 30\"x3         Pt ed HEP rev KT            Ther Activity                                       Gait Training                                       Modalities                                                "

## 2024-10-07 NOTE — NURSING NOTE
Call placed to pt to discuss upcoming appointment at [facility] Radiology Department.  No answer.  Information left on pts voicemail.  Pt is having a R Shoulder Arthrogram completed on 10/9/2024.  Pre procedure instructions given including diet and taking own medications.  Instructed pt that she may eat normally and take medications as usual before the procedure.  Reminded pt of the location, date and time of procedure.  Phone number given for pt to call with any questions.

## 2024-10-09 ENCOUNTER — HOSPITAL ENCOUNTER (OUTPATIENT)
Dept: MRI IMAGING | Facility: HOSPITAL | Age: 34
Discharge: HOME | End: 2024-10-09
Payer: COMMERCIAL

## 2024-10-09 ENCOUNTER — HOSPITAL ENCOUNTER (OUTPATIENT)
Dept: RADIOLOGY | Facility: HOSPITAL | Age: 34
Discharge: HOME | End: 2024-10-09
Payer: COMMERCIAL

## 2024-10-09 DIAGNOSIS — M75.41 SHOULDER IMPINGEMENT SYNDROME, RIGHT: ICD-10-CM

## 2024-10-09 DIAGNOSIS — S43.431A LABRAL TEAR OF SHOULDER, RIGHT, INITIAL ENCOUNTER: ICD-10-CM

## 2024-10-09 PROCEDURE — 73222 MRI JOINT UPR EXTREM W/DYE: CPT

## 2024-10-09 PROCEDURE — 77002 NEEDLE LOCALIZATION BY XRAY: CPT

## 2024-10-09 PROCEDURE — 23350 INJECTION FOR SHOULDER X-RAY: CPT

## 2024-10-09 PROCEDURE — A9585 GADOBUTROL INJECTION: HCPCS | Performed by: PHYSICIAN ASSISTANT

## 2024-10-09 RX ORDER — GADOBUTROL 604.72 MG/ML
0.2 INJECTION INTRAVENOUS
Status: COMPLETED | OUTPATIENT
Start: 2024-10-09 | End: 2024-10-09

## 2024-10-09 RX ORDER — ROPIVACAINE HYDROCHLORIDE 2 MG/ML
2 INJECTION, SOLUTION EPIDURAL; INFILTRATION; PERINEURAL
Status: DISCONTINUED | OUTPATIENT
Start: 2024-10-09 | End: 2024-10-10 | Stop reason: HOSPADM

## 2024-10-09 RX ADMIN — ROPIVACAINE HYDROCHLORIDE 2 ML: 2 INJECTION EPIDURAL; INFILTRATION; PERINEURAL at 14:07

## 2024-10-09 RX ADMIN — GADOBUTROL 0.2 ML: 604.72 INJECTION INTRAVENOUS at 14:06

## 2024-10-09 RX ADMIN — IOHEXOL 1 ML: 300 INJECTION, SOLUTION INTRAVENOUS at 14:06

## 2024-10-14 ENCOUNTER — APPOINTMENT (OUTPATIENT)
Dept: PHYSICAL THERAPY | Facility: CLINIC | Age: 34
End: 2024-10-14
Payer: COMMERCIAL

## 2024-10-25 ENCOUNTER — OFFICE VISIT (OUTPATIENT)
Dept: OBGYN CLINIC | Facility: CLINIC | Age: 34
End: 2024-10-25
Payer: COMMERCIAL

## 2024-10-25 VITALS
HEART RATE: 69 BPM | HEIGHT: 61 IN | WEIGHT: 110 LBS | SYSTOLIC BLOOD PRESSURE: 114 MMHG | BODY MASS INDEX: 20.77 KG/M2 | DIASTOLIC BLOOD PRESSURE: 75 MMHG

## 2024-10-25 DIAGNOSIS — S43.431A SUPERIOR GLENOID LABRUM LESION OF RIGHT SHOULDER, INITIAL ENCOUNTER: Primary | ICD-10-CM

## 2024-10-25 PROCEDURE — 99214 OFFICE O/P EST MOD 30 MIN: CPT | Performed by: STUDENT IN AN ORGANIZED HEALTH CARE EDUCATION/TRAINING PROGRAM

## 2024-10-25 RX ORDER — CHLORHEXIDINE GLUCONATE ORAL RINSE 1.2 MG/ML
15 SOLUTION DENTAL ONCE
OUTPATIENT
Start: 2024-10-25 | End: 2024-10-25

## 2024-10-25 NOTE — PROGRESS NOTES
Ortho Sports Medicine Shoulder New Patient Visit     Assesment:   34 y.o. female right shoulder SLAP tear, impingement syndrome    Plan:  Patient presents with 7 months of shoulder pain with some antecedent pain in the shoulder as well over the last several years.  She has symptomatic type II SLAP tear that has been not improving with 7 months of activity modification, rest, home directed exercise program, as well as formal physical therapy.  More recently she has been unable unable to tolerate formal physical therapy due to extent of her pain.  We discussed the pathophysiology of SLAP tears especially in a .  I recommended the possibility of ongoing nonoperative treatment with ultrasound-guided glenohumeral corticosteroid injection versus surgical intervention in the form of shoulder arthroscopy and labral repair, versus biceps tenodesis, and all associate procedures.  Patient is interested in proceeding surgical intervention as she is frustrated with her lack of improvement over the last several months and she worries a injection will be short-lived relief.  We mutually agreed to move forward with surgical intervention after risk-benefit and alternatives were discussed at length.      Given that the patient has failed conservative treatment and continues to have severe pain and/or dysfunction that limits their activities of daily living, they would like to proceed with operative intervention. We discussed with the patient the risks of no treatment, non-operative treatment, and operative treatment. The risks of operative intervention were discussed and include but are not limited to: Infection, bleeding, stiffness, loss of range of motion, blood clot, failure of surgery, fracture, delayed union, nonunion, malunion, risk of potential future arthritis, continued problems with swelling, injury to surrounding structures/nerve/artery/vein, recurrence of cysts, failure of hardware, retained hardware and/or  foreign body, symptomatic hardware, and continued instability, pain, dysfunction, or disability despite repair.       Conservative treatment:    Discussed with the patient that it's reasonable to try a localized cortisone injection via US with a dedicated course of PT. Patient wished to proceed with surgical intervention due to failing non operative treatments that she has completed over the last 7 months with continued pain.    Imaging:    All imaging from today was reviewed by myself and explained to the patient.       Injection:    No Injection planned at this time.      Surgery:     All of the risks and benefits of operative treatment were explained to the patient, as well as the risks and benefits of any alternative treatment options, including nonoperative care. This risks of surgery include, but are not limited to, infection, bleeding, blood clot, damage to nerves/arteries, need for further surgyer, cardiovascular risk, anesthesia risk, and continued pain.  The patient understood this and elects to proceed forward with surgical intervention.  We will proceed forward with surgical arthroscopy of the shoulder with SLAP repair with biceps tenodesis with all other associated procedures      Follow up:    Return for Follow up post op.        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      The patient denies a history of diabetes.  The patient denies a history of thyroid disorder.      Pain is located posterior, lateral.  The patient rates the pain as a 8/10.  The pain has been present for 8 months.      The patient denies specific right shoulder injury but states that she has been noticed some intermittent lateral aspect right shoulder pain  since March.  Patient states that she does  her daughters field hockey/softball team and does state that she was throwing a ball around and did feel some pain after that time.  Patient states that she was able to nurse the shoulder back in she was doing well.  Patient reports that after that time she had full range of motion and strength.  Patient reports that then in May 2024 she was throwing a waterlogged softball and when she felt a pop within the shoulder.  Patient reports that after that moment she was able to range the shoulder but states that she has had pain ever since.  Patient reports all of her pain is deep within the shoulder but it does radiate to the lateral and posterior aspect of the shoulder.  Patient also notes intermittent clicking within the shoulder with ROM since the injury. She also notes pain at end that will wake her form sleep.  The pain is characterized as sharp, stabbing, dull, achy.  The pain is present at all times.     Pain is improved by rest.  Pain is aggravated by overhead activity, reaching back, rotation, lifting , and exercising.    Symptoms include clicking and catching.    The patient denies weakness.  The patient denies numbness and tingling.     The patient has tried rest, ice, NSAIDS, and physical therapy.          Shoulder Surgical History:  None    Past Medical, Social and Family History:  Past Medical History:   Diagnosis Date    Exercises 5 to 6 times per week     PONV (postoperative nausea and vomiting)     with C Sections//and dizziness    Wears glasses     and contacts     Past Surgical History:   Procedure Laterality Date     SECTION      x2    FL INJECTION RIGHT SHOULDER (ARTHROGRAM)  10/9/2024    SC MASTOPEXY Bilateral 2020    Procedure: BREAST MASTOPEXY;  Surgeon: Kashif Grande MD;  Location: [facility];  Service: Plastics    WISDOM TOOTH EXTRACTION       Allergies   Allergen Reactions    Bactrim [Sulfamethoxazole-Trimethoprim] Hives     Current  Outpatient Medications on File Prior to Visit   Medication Sig Dispense Refill    Multiple Vitamin (MULTI-VITAMIN PO) Take by mouth      fluticasone (FLONASE) 50 mcg/act nasal spray 2 sprays into each nostril daily (Patient not taking: Reported on 9/10/2024) 16 g 0    [DISCONTINUED] benzonatate (TESSALON) 200 MG capsule Take 1 capsule (200 mg total) by mouth 3 (three) times a day as needed for cough (Patient not taking: Reported on 9/10/2024) 20 capsule 0    [DISCONTINUED] norethindrone-ethinyl estradiol (JUNEL FE 1/20) 1-20 MG-MCG per tablet Take 1 tablet by mouth every evening (Patient not taking: Reported on 9/10/2024)       No current facility-administered medications on file prior to visit.     Social History     Socioeconomic History    Marital status: /Civil Union     Spouse name: Not on file    Number of children: Not on file    Years of education: Not on file    Highest education level: Not on file   Occupational History    Not on file   Tobacco Use    Smoking status: Never    Smokeless tobacco: Never   Vaping Use    Vaping status: Never Used   Substance and Sexual Activity    Alcohol use: Yes     Comment: minimal    Drug use: Never    Sexual activity: Not on file   Other Topics Concern    Not on file   Social History Narrative    Not on file     Social Determinants of Health     Financial Resource Strain: Not on file   Food Insecurity: Not on file   Transportation Needs: Not on file   Physical Activity: Not on file   Stress: Not on file   Social Connections: Not on file   Intimate Partner Violence: Not on file   Housing Stability: Not on file         I have reviewed the past medical, surgical, social and family history, medications and allergies as documented in the EMR.    Review of systems: ROS is negative other than that noted in the HPI.  Constitutional: Negative for fatigue and fever.   HENT: Negative for sore throat.    Respiratory: Negative for shortness of breath.    Cardiovascular: Negative  "for chest pain.   Gastrointestinal: Negative for abdominal pain.   Endocrine: Negative for cold intolerance and heat intolerance.   Genitourinary: Negative for flank pain.   Musculoskeletal: Negative for back pain.   Skin: Negative for rash.   Allergic/Immunologic: Negative for immunocompromised state.   Neurological: Negative for dizziness.   Psychiatric/Behavioral: Negative for agitation.      Physical Exam:    Blood pressure 114/75, pulse 69, height 5' 1\" (1.549 m), weight 49.9 kg (110 lb), not currently breastfeeding.    General/Constitutional: NAD, well developed, well nourished  HENT: Normocephalic, atraumatic  CV: Intact distal pulses, regular rate  Resp: No respiratory distress or labored breathing  Lymphatic: No lymphadenopathy palpated  Neuro: Alert and Oriented x 3, no focal deficits  Psych: Normal mood, normal affect, normal judgement, normal behavior  Skin: Warm, dry, no rashes, no erythema    RIGHT  Shoulder focused exam:     Visual inspection of the shoulder demonstrates normal contour without atrophy  No evidence of scapular dyskinesia or atrophy.  No scapular winging  Active and passive range of motion demonstrates forward flexion to 180, abduction to 150 with pain, extension of N/A, external rotation is approximately 90 with the arm in 90° of abduction, external rotation is 90 with the arm the side, internal rotation to Sacrum   Rotator cuff strength is 5/5 to resisted forward flexion, 4+/5 resisted abduction, 5/5 resisted external rotation, NA resisted internal rotation  No tenderness to palpation at the distal clavicle, AC joint, acromion, or scapular spine  No pain with cross-body adduction  Negative Neer's test, negative modified Amor impingement test  Negative external rotation lag sign  Negative belly press, negative lift-off  + speed's and Yergason's test  + tenderness to palpation at the bicipital groove  + Bennington's test alleviate  + pain Abduction external rotation            UE NV " Exam: +2 Radial pulses bilaterally  Sensation intact to light touch C5-T1 bilaterally, Radial/median/ulnar nerve motor intact      Bilateral elbow, wrist, and and forearm ROM full, painless with passive ROM, no ttp or crepitance throughout extremities below shoulder joint    Cervical ROM is full without pain, numbness or tingling      Shoulder Imaging    X-rays of the right shoulder were reviewed from 9/10/2024, which demonstrate No acute osseous abnormality.  Small calcific density adjacent the humeral head, consistent with calcific tendinitis..  I have reviewed the radiology report and agree with their impression.    MRI of the right shoulder were reviewed from 10/9/2024, which demonstrate Type II SLAP tear.     Small Hill-Sachs fracture deformity likely chronic.     Small linear calcific tendinosis seen on the prior radiograph is difficult to visualize on this MRI study..  I have reviewed the radiology report and agree with their impression.      Scribe Attestation      I,:  Hodan Hylton am acting as a scribe while in the presence of the attending physician.:       I,:  North Concepcion, DO personally performed the services described in this documentation    as scribed in my presence.:

## 2024-10-25 NOTE — LETTER
October 25, 2024     Dennise Ponce, PT    Patient: Isha Jacobo   YOB: 1990   Date of Visit: 10/25/2024       Dear Dr. Ponce:    Thank you for referring Isha Jacobo to me for evaluation. Below are my notes for this consultation.    If you have questions, please do not hesitate to call me. I look forward to following your patient along with you.         Sincerely,        North Concepcion DO        CC: No Recipients    North Concepcion DO  10/25/2024  9:57 AM  Sign when Signing Visit  Ortho Sports Medicine Shoulder New Patient Visit     Assesment:   34 y.o. female right shoulder SLAP tear, impingement syndrome    Plan:  Patient presents with 7 months of shoulder pain with some antecedent pain in the shoulder as well over the last several years.  She has symptomatic type II SLAP tear that has been not improving with 7 months of activity modification, rest, home directed exercise program, as well as formal physical therapy.  More recently she has been unable unable to tolerate formal physical therapy due to extent of her pain.  We discussed the pathophysiology of SLAP tears especially in a .  I recommended the possibility of ongoing nonoperative treatment with ultrasound-guided glenohumeral corticosteroid injection versus surgical intervention in the form of shoulder arthroscopy and labral repair, versus biceps tenodesis, and all associate procedures.  Patient is interested in proceeding surgical intervention as she is frustrated with her lack of improvement over the last several months and she worries a injection will be short-lived relief.  We mutually agreed to move forward with surgical intervention after risk-benefit and alternatives were discussed at length.      Given that the patient has failed conservative treatment and continues to have severe pain and/or dysfunction that limits their activities of daily living, they would like to proceed with operative  intervention. We discussed with the patient the risks of no treatment, non-operative treatment, and operative treatment. The risks of operative intervention were discussed and include but are not limited to: Infection, bleeding, stiffness, loss of range of motion, blood clot, failure of surgery, fracture, delayed union, nonunion, malunion, risk of potential future arthritis, continued problems with swelling, injury to surrounding structures/nerve/artery/vein, recurrence of cysts, failure of hardware, retained hardware and/or foreign body, symptomatic hardware, and continued instability, pain, dysfunction, or disability despite repair.       Conservative treatment:    Discussed with the patient that it's reasonable to try a localized cortisone injection via US with a dedicated course of PT. Patient wished to proceed with surgical intervention due to failing non operative treatments that she has completed over the last 7 months with continued pain.    Imaging:    All imaging from today was reviewed by myself and explained to the patient.       Injection:    No Injection planned at this time.      Surgery:     All of the risks and benefits of operative treatment were explained to the patient, as well as the risks and benefits of any alternative treatment options, including nonoperative care. This risks of surgery include, but are not limited to, infection, bleeding, blood clot, damage to nerves/arteries, need for further surgyer, cardiovascular risk, anesthesia risk, and continued pain.  The patient understood this and elects to proceed forward with surgical intervention.  We will proceed forward with surgical arthroscopy of the shoulder with SLAP repair with biceps tenodesis with all other associated procedures      Follow up:    Return for Follow up post op.        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      The patient denies a history of diabetes.  The patient denies a history of thyroid disorder.      Pain is located posterior, lateral.  The patient rates the pain as a 8/10.  The pain has been present for 8 months.      The patient denies specific right shoulder injury but states that she has been noticed some intermittent lateral aspect right shoulder pain since March.  Patient states that she does  her daughters field hockey/softball team and does state that she was throwing a ball around and did feel some pain after that time.  Patient states that she was able to nurse the shoulder back in she was doing well.  Patient reports that after that time she had full range of motion and strength.  Patient reports that then in May 2024 she was throwing a waterlogged softball and when she felt a pop within the shoulder.  Patient reports that after that moment she was able to range the shoulder but states that she has had pain ever since.  Patient reports all of her pain is deep within the shoulder but it does radiate to the lateral and posterior aspect of the shoulder.  Patient also notes intermittent clicking within the shoulder with ROM since the injury. She also notes pain at end that will wake her form sleep.  The pain is characterized as sharp, stabbing, dull, achy.  The pain is present at all times.     Pain is improved by rest.  Pain is aggravated by overhead activity, reaching back, rotation, lifting , and exercising.    Symptoms include clicking and catching.    The patient denies weakness.  The patient denies numbness and tingling.     The patient has tried rest, ice, NSAIDS, and physical therapy.          Shoulder Surgical History:  None    Past Medical, Social and Family History:  Past Medical History:   Diagnosis Date   • Exercises 5  to 6 times per week    • PONV (postoperative nausea and vomiting)     with C Sections//and dizziness   • Wears glasses     and contacts     Past Surgical History:   Procedure Laterality Date   •  SECTION      x2   • FL INJECTION RIGHT SHOULDER (ARTHROGRAM)  10/9/2024   • LA MASTOPEXY Bilateral 2020    Procedure: BREAST MASTOPEXY;  Surgeon: Kashif Grande MD;  Location: [facility] OR;  Service: Plastics   • WISDOM TOOTH EXTRACTION       Allergies   Allergen Reactions   • Bactrim [Sulfamethoxazole-Trimethoprim] Hives     Current Outpatient Medications on File Prior to Visit   Medication Sig Dispense Refill   • Multiple Vitamin (MULTI-VITAMIN PO) Take by mouth     • fluticasone (FLONASE) 50 mcg/act nasal spray 2 sprays into each nostril daily (Patient not taking: Reported on 9/10/2024) 16 g 0   • [DISCONTINUED] benzonatate (TESSALON) 200 MG capsule Take 1 capsule (200 mg total) by mouth 3 (three) times a day as needed for cough (Patient not taking: Reported on 9/10/2024) 20 capsule 0   • [DISCONTINUED] norethindrone-ethinyl estradiol (JUNEL FE 1/20) 1-20 MG-MCG per tablet Take 1 tablet by mouth every evening (Patient not taking: Reported on 9/10/2024)       No current facility-administered medications on file prior to visit.     Social History     Socioeconomic History   • Marital status: /Civil Union     Spouse name: Not on file   • Number of children: Not on file   • Years of education: Not on file   • Highest education level: Not on file   Occupational History   • Not on file   Tobacco Use   • Smoking status: Never   • Smokeless tobacco: Never   Vaping Use   • Vaping status: Never Used   Substance and Sexual Activity   • Alcohol use: Yes     Comment: minimal   • Drug use: Never   • Sexual activity: Not on file   Other Topics Concern   • Not on file   Social History Narrative   • Not on file     Social Determinants of Health     Financial Resource Strain: Not on file   Food Insecurity: Not on file  "  Transportation Needs: Not on file   Physical Activity: Not on file   Stress: Not on file   Social Connections: Not on file   Intimate Partner Violence: Not on file   Housing Stability: Not on file         I have reviewed the past medical, surgical, social and family history, medications and allergies as documented in the EMR.    Review of systems: ROS is negative other than that noted in the HPI.  Constitutional: Negative for fatigue and fever.   HENT: Negative for sore throat.    Respiratory: Negative for shortness of breath.    Cardiovascular: Negative for chest pain.   Gastrointestinal: Negative for abdominal pain.   Endocrine: Negative for cold intolerance and heat intolerance.   Genitourinary: Negative for flank pain.   Musculoskeletal: Negative for back pain.   Skin: Negative for rash.   Allergic/Immunologic: Negative for immunocompromised state.   Neurological: Negative for dizziness.   Psychiatric/Behavioral: Negative for agitation.      Physical Exam:    Blood pressure 114/75, pulse 69, height 5' 1\" (1.549 m), weight 49.9 kg (110 lb), not currently breastfeeding.    General/Constitutional: NAD, well developed, well nourished  HENT: Normocephalic, atraumatic  CV: Intact distal pulses, regular rate  Resp: No respiratory distress or labored breathing  Lymphatic: No lymphadenopathy palpated  Neuro: Alert and Oriented x 3, no focal deficits  Psych: Normal mood, normal affect, normal judgement, normal behavior  Skin: Warm, dry, no rashes, no erythema    RIGHT  Shoulder focused exam:     Visual inspection of the shoulder demonstrates normal contour without atrophy  No evidence of scapular dyskinesia or atrophy.  No scapular winging  Active and passive range of motion demonstrates forward flexion to 180, abduction to 150 with pain, extension of N/A, external rotation is approximately 90 with the arm in 90° of abduction, external rotation is 90 with the arm the side, internal rotation to Sacrum   Rotator cuff " strength is 5/5 to resisted forward flexion, 4+/5 resisted abduction, 5/5 resisted external rotation, NA resisted internal rotation  No tenderness to palpation at the distal clavicle, AC joint, acromion, or scapular spine  No pain with cross-body adduction  Negative Neer's test, negative modified Amor impingement test  Negative external rotation lag sign  Negative belly press, negative lift-off  + speed's and Yergason's test  + tenderness to palpation at the bicipital groove  + [place]'s test alleviate  + pain Abduction external rotation            UE NV Exam: +2 Radial pulses bilaterally  Sensation intact to light touch C5-T1 bilaterally, Radial/median/ulnar nerve motor intact      Bilateral elbow, wrist, and and forearm ROM full, painless with passive ROM, no ttp or crepitance throughout extremities below shoulder joint    Cervical ROM is full without pain, numbness or tingling      Shoulder Imaging    X-rays of the right shoulder were reviewed from 9/10/2024, which demonstrate No acute osseous abnormality.  Small calcific density adjacent the humeral head, consistent with calcific tendinitis..  I have reviewed the radiology report and agree with their impression.    MRI of the right shoulder were reviewed from 10/9/2024, which demonstrate Type II SLAP tear.     Small Hill-Sachs fracture deformity likely chronic.     Small linear calcific tendinosis seen on the prior radiograph is difficult to visualize on this MRI study..  I have reviewed the radiology report and agree with their impression.      Scribe Attestation      I,:  Hodan Hylton am acting as a scribe while in the presence of the attending physician.:       I,:  North Concepcion DO personally performed the services described in this documentation    as scribed in my presence.:

## 2024-11-22 NOTE — PRE-PROCEDURE INSTRUCTIONS
Pre-Surgery Instructions:   Medication Instructions    fluticasone (FLONASE) 50 mcg/act nasal spray Uses PRN- OK to take day of surgery    Multiple Vitamin (MULTI-VITAMIN PO) Stop taking 7 days prior to surgery.    Medication instructions for day surgery reviewed. Please use only a sip of water to take your instructed medications. Avoid all over the counter vitamins, supplements and NSAIDS for one week prior to surgery per anesthesia guidelines. Tylenol is ok to take as needed.     You will receive a call one business day prior to surgery with an arrival time and hospital directions. If your surgery is scheduled on a Monday, the hospital will be calling you on the Friday prior to your surgery. If you have not heard from anyone by 8pm, please call the hospital supervisor through the hospital  at [phone]. ([place] [phone] or [place] [phone]).    Do not eat or drink anything after midnight the night before your surgery, including candy, mints, lifesavers, or chewing gum. Do not drink alcohol 24hrs before your surgery. Try not to smoke at least 24hrs before your surgery.       Follow the pre surgery showering instructions as listed in the “My Surgical Experience Booklet” or otherwise provided by your surgeon's office. Do not use a blade to shave the surgical area 1 week before surgery. It is okay to use a clean electric clippers up to 24 hours before surgery. Do not apply any lotions, creams, including makeup, cologne, deodorant, or perfumes after showering on the day of your surgery. Do not use dry shampoo, hair spray, hair gel, or any type of hair products.     No contact lenses, eye make-up, or artificial eyelashes. Remove nail polish, including gel polish, and any artificial, gel, or acrylic nails if possible. Remove all jewelry including rings and body piercing jewelry.     Wear causal clothing that is easy to take on and off. Consider your type of surgery.    Keep any valuables, jewelry,  piercings at home. Please bring any specially ordered equipment (sling, braces) if indicated.    Arrange for a responsible person to drive you to and from the hospital on the day of your surgery. Please confirm the visitor policy for the day of your procedure when you receive your phone call with an arrival time.     Call the surgeon's office with any new illnesses, exposures, or additional questions prior to surgery.    Please reference your “My Surgical Experience Booklet” for additional information to prepare for your upcoming surgery.

## 2024-12-02 PROCEDURE — NC001 PR NO CHARGE: Performed by: STUDENT IN AN ORGANIZED HEALTH CARE EDUCATION/TRAINING PROGRAM

## 2024-12-03 ENCOUNTER — ANESTHESIA (OUTPATIENT)
Dept: PERIOP | Facility: HOSPITAL | Age: 34
End: 2024-12-03
Payer: COMMERCIAL

## 2024-12-03 ENCOUNTER — HOSPITAL ENCOUNTER (OUTPATIENT)
Facility: HOSPITAL | Age: 34
Setting detail: OUTPATIENT SURGERY
Discharge: HOME | End: 2024-12-03
Attending: STUDENT IN AN ORGANIZED HEALTH CARE EDUCATION/TRAINING PROGRAM | Admitting: STUDENT IN AN ORGANIZED HEALTH CARE EDUCATION/TRAINING PROGRAM
Payer: COMMERCIAL

## 2024-12-03 ENCOUNTER — ANESTHESIA EVENT (OUTPATIENT)
Dept: PERIOP | Facility: HOSPITAL | Age: 34
End: 2024-12-03
Payer: COMMERCIAL

## 2024-12-03 VITALS
HEIGHT: 61 IN | HEART RATE: 56 BPM | TEMPERATURE: 97.5 F | BODY MASS INDEX: 20.65 KG/M2 | OXYGEN SATURATION: 100 % | SYSTOLIC BLOOD PRESSURE: 101 MMHG | DIASTOLIC BLOOD PRESSURE: 61 MMHG | WEIGHT: 109.4 LBS | RESPIRATION RATE: 16 BRPM

## 2024-12-03 DIAGNOSIS — S43.431A SUPERIOR GLENOID LABRUM LESION OF RIGHT SHOULDER, INITIAL ENCOUNTER: Primary | ICD-10-CM

## 2024-12-03 DIAGNOSIS — Z98.890 S/P ARTHROSCOPY OF RIGHT SHOULDER: Primary | ICD-10-CM

## 2024-12-03 LAB
EXT PREGNANCY TEST URINE: NEGATIVE
EXT. CONTROL: NORMAL

## 2024-12-03 PROCEDURE — C1713 ANCHOR/SCREW BN/BN,TIS/BN: HCPCS | Performed by: STUDENT IN AN ORGANIZED HEALTH CARE EDUCATION/TRAINING PROGRAM

## 2024-12-03 PROCEDURE — 29828 SHO ARTHRS SRG BICP TENODSIS: CPT | Performed by: STUDENT IN AN ORGANIZED HEALTH CARE EDUCATION/TRAINING PROGRAM

## 2024-12-03 PROCEDURE — 29826 SHO ARTHRS SRG DECOMPRESSION: CPT | Performed by: STUDENT IN AN ORGANIZED HEALTH CARE EDUCATION/TRAINING PROGRAM

## 2024-12-03 PROCEDURE — 29807 SHO ARTHRS SRG RPR SLAP LES: CPT | Performed by: STUDENT IN AN ORGANIZED HEALTH CARE EDUCATION/TRAINING PROGRAM

## 2024-12-03 PROCEDURE — 81025 URINE PREGNANCY TEST: CPT | Performed by: STUDENT IN AN ORGANIZED HEALTH CARE EDUCATION/TRAINING PROGRAM

## 2024-12-03 PROCEDURE — C9290 INJ, BUPIVACAINE LIPOSOME: HCPCS | Performed by: STUDENT IN AN ORGANIZED HEALTH CARE EDUCATION/TRAINING PROGRAM

## 2024-12-03 DEVICE — BIO-COMP SWVLK C, CLD 4.75X19.1MM
Type: IMPLANTABLE DEVICE | Site: SHOULDER | Status: FUNCTIONAL
Brand: ARTHREX®

## 2024-12-03 DEVICE — KL 1.8 FIBERTAK, SHOULDER
Type: IMPLANTABLE DEVICE | Site: SHOULDER | Status: FUNCTIONAL
Brand: ARTHREX®

## 2024-12-03 RX ORDER — HYDROMORPHONE HCL/PF 1 MG/ML
0.5 SYRINGE (ML) INJECTION
Status: DISCONTINUED | OUTPATIENT
Start: 2024-12-03 | End: 2024-12-03 | Stop reason: HOSPADM

## 2024-12-03 RX ORDER — SODIUM CHLORIDE, SODIUM LACTATE, POTASSIUM CHLORIDE, CALCIUM CHLORIDE 600; 310; 30; 20 MG/100ML; MG/100ML; MG/100ML; MG/100ML
INJECTION, SOLUTION INTRAVENOUS CONTINUOUS PRN
Status: DISCONTINUED | OUTPATIENT
Start: 2024-12-03 | End: 2024-12-03

## 2024-12-03 RX ORDER — PROMETHAZINE HYDROCHLORIDE 25 MG/ML
12.5 INJECTION, SOLUTION INTRAMUSCULAR; INTRAVENOUS ONCE
Status: COMPLETED | OUTPATIENT
Start: 2024-12-03 | End: 2024-12-03

## 2024-12-03 RX ORDER — SODIUM CHLORIDE, SODIUM LACTATE, POTASSIUM CHLORIDE, CALCIUM CHLORIDE 600; 310; 30; 20 MG/100ML; MG/100ML; MG/100ML; MG/100ML
10 INJECTION, SOLUTION INTRAVENOUS CONTINUOUS
Status: DISCONTINUED | OUTPATIENT
Start: 2024-12-03 | End: 2024-12-03 | Stop reason: HOSPADM

## 2024-12-03 RX ORDER — OXYCODONE HYDROCHLORIDE 5 MG/1
5 TABLET ORAL EVERY 4 HOURS PRN
Status: DISCONTINUED | OUTPATIENT
Start: 2024-12-03 | End: 2024-12-03 | Stop reason: HOSPADM

## 2024-12-03 RX ORDER — ONDANSETRON 2 MG/ML
INJECTION INTRAMUSCULAR; INTRAVENOUS AS NEEDED
Status: DISCONTINUED | OUTPATIENT
Start: 2024-12-03 | End: 2024-12-03

## 2024-12-03 RX ORDER — CHLORHEXIDINE GLUCONATE ORAL RINSE 1.2 MG/ML
15 SOLUTION DENTAL ONCE
Status: DISCONTINUED | OUTPATIENT
Start: 2024-12-03 | End: 2024-12-03 | Stop reason: HOSPADM

## 2024-12-03 RX ORDER — OXYCODONE HYDROCHLORIDE 5 MG/1
10 TABLET ORAL EVERY 4 HOURS PRN
Status: DISCONTINUED | OUTPATIENT
Start: 2024-12-03 | End: 2024-12-03 | Stop reason: HOSPADM

## 2024-12-03 RX ORDER — ONDANSETRON 2 MG/ML
4 INJECTION INTRAMUSCULAR; INTRAVENOUS EVERY 6 HOURS PRN
Status: CANCELLED | OUTPATIENT
Start: 2024-12-03

## 2024-12-03 RX ORDER — FENTANYL CITRATE/PF 50 MCG/ML
25 SYRINGE (ML) INJECTION
Status: DISCONTINUED | OUTPATIENT
Start: 2024-12-03 | End: 2024-12-03 | Stop reason: HOSPADM

## 2024-12-03 RX ORDER — ACETAMINOPHEN 325 MG/1
650 TABLET ORAL EVERY 6 HOURS PRN
Status: CANCELLED | OUTPATIENT
Start: 2024-12-03

## 2024-12-03 RX ORDER — BUPIVACAINE HYDROCHLORIDE 5 MG/ML
INJECTION, SOLUTION EPIDURAL; INTRACAUDAL AS NEEDED
Status: DISCONTINUED | OUTPATIENT
Start: 2024-12-03 | End: 2024-12-03

## 2024-12-03 RX ORDER — MIDAZOLAM HYDROCHLORIDE 2 MG/2ML
INJECTION, SOLUTION INTRAMUSCULAR; INTRAVENOUS AS NEEDED
Status: DISCONTINUED | OUTPATIENT
Start: 2024-12-03 | End: 2024-12-03

## 2024-12-03 RX ORDER — DEXAMETHASONE SODIUM PHOSPHATE 10 MG/ML
INJECTION, SOLUTION INTRAMUSCULAR; INTRAVENOUS AS NEEDED
Status: DISCONTINUED | OUTPATIENT
Start: 2024-12-03 | End: 2024-12-03

## 2024-12-03 RX ORDER — NAPROXEN 500 MG/1
500 TABLET ORAL 2 TIMES DAILY WITH MEALS
Qty: 60 TABLET | Refills: 0 | Status: SHIPPED | OUTPATIENT
Start: 2024-12-03 | End: 2025-01-02

## 2024-12-03 RX ORDER — ROCURONIUM BROMIDE 10 MG/ML
INJECTION, SOLUTION INTRAVENOUS AS NEEDED
Status: DISCONTINUED | OUTPATIENT
Start: 2024-12-03 | End: 2024-12-03

## 2024-12-03 RX ORDER — CEFAZOLIN SODIUM 2 G/50ML
2000 SOLUTION INTRAVENOUS ONCE
Status: COMPLETED | OUTPATIENT
Start: 2024-12-03 | End: 2024-12-03

## 2024-12-03 RX ORDER — ACETAMINOPHEN 500 MG
1000 TABLET ORAL EVERY 6 HOURS PRN
Qty: 30 TABLET | Refills: 1 | Status: SHIPPED | OUTPATIENT
Start: 2024-12-03

## 2024-12-03 RX ORDER — ONDANSETRON 4 MG/1
4 TABLET, FILM COATED ORAL EVERY 8 HOURS PRN
Qty: 20 TABLET | Refills: 0 | Status: SHIPPED | OUTPATIENT
Start: 2024-12-03 | End: 2024-12-11

## 2024-12-03 RX ORDER — FENTANYL CITRATE 50 UG/ML
INJECTION, SOLUTION INTRAMUSCULAR; INTRAVENOUS AS NEEDED
Status: DISCONTINUED | OUTPATIENT
Start: 2024-12-03 | End: 2024-12-03

## 2024-12-03 RX ORDER — NEOSTIGMINE METHYLSULFATE 1 MG/ML
INJECTION INTRAVENOUS AS NEEDED
Status: DISCONTINUED | OUTPATIENT
Start: 2024-12-03 | End: 2024-12-03

## 2024-12-03 RX ORDER — OXYCODONE HYDROCHLORIDE 5 MG/1
5 TABLET ORAL EVERY 4 HOURS PRN
Qty: 10 TABLET | Refills: 0 | Status: SHIPPED | OUTPATIENT
Start: 2024-12-03 | End: 2024-12-11

## 2024-12-03 RX ORDER — ASPIRIN 81 MG/1
81 TABLET, CHEWABLE ORAL 2 TIMES DAILY
Qty: 60 TABLET | Refills: 0 | Status: SHIPPED | OUTPATIENT
Start: 2024-12-03 | End: 2025-01-02

## 2024-12-03 RX ORDER — HYDROMORPHONE HCL/PF 1 MG/ML
SYRINGE (ML) INJECTION AS NEEDED
Status: DISCONTINUED | OUTPATIENT
Start: 2024-12-03 | End: 2024-12-03

## 2024-12-03 RX ORDER — GLYCOPYRROLATE 0.2 MG/ML
INJECTION INTRAMUSCULAR; INTRAVENOUS AS NEEDED
Status: DISCONTINUED | OUTPATIENT
Start: 2024-12-03 | End: 2024-12-03

## 2024-12-03 RX ORDER — LIDOCAINE HYDROCHLORIDE 20 MG/ML
INJECTION, SOLUTION EPIDURAL; INFILTRATION; INTRACAUDAL; PERINEURAL AS NEEDED
Status: DISCONTINUED | OUTPATIENT
Start: 2024-12-03 | End: 2024-12-03

## 2024-12-03 RX ORDER — ONDANSETRON 2 MG/ML
4 INJECTION INTRAMUSCULAR; INTRAVENOUS ONCE AS NEEDED
Status: COMPLETED | OUTPATIENT
Start: 2024-12-03 | End: 2024-12-03

## 2024-12-03 RX ORDER — PROPOFOL 10 MG/ML
INJECTION, EMULSION INTRAVENOUS AS NEEDED
Status: DISCONTINUED | OUTPATIENT
Start: 2024-12-03 | End: 2024-12-03

## 2024-12-03 RX ADMIN — DEXAMETHASONE SODIUM PHOSPHATE 10 MG: 10 INJECTION, SOLUTION INTRAMUSCULAR; INTRAVENOUS at 07:40

## 2024-12-03 RX ADMIN — NEOSTIGMINE METHYLSULFATE 3 MG: 1 INJECTION INTRAVENOUS at 08:57

## 2024-12-03 RX ADMIN — CEFAZOLIN SODIUM 2000 MG: 2 SOLUTION INTRAVENOUS at 07:23

## 2024-12-03 RX ADMIN — HYDROMORPHONE HYDROCHLORIDE 0.75 MG: 1 INJECTION, SOLUTION INTRAMUSCULAR; INTRAVENOUS; SUBCUTANEOUS at 08:50

## 2024-12-03 RX ADMIN — ONDANSETRON 4 MG: 2 INJECTION INTRAMUSCULAR; INTRAVENOUS at 10:08

## 2024-12-03 RX ADMIN — ONDANSETRON 4 MG: 2 INJECTION INTRAMUSCULAR; INTRAVENOUS at 08:51

## 2024-12-03 RX ADMIN — FENTANYL CITRATE 100 MCG: 50 INJECTION INTRAMUSCULAR; INTRAVENOUS at 07:08

## 2024-12-03 RX ADMIN — HYDROMORPHONE HYDROCHLORIDE 0.25 MG: 1 INJECTION, SOLUTION INTRAMUSCULAR; INTRAVENOUS; SUBCUTANEOUS at 07:45

## 2024-12-03 RX ADMIN — LIDOCAINE HYDROCHLORIDE 3 ML: 20 INJECTION, SOLUTION EPIDURAL; INFILTRATION; INTRACAUDAL; PERINEURAL at 07:12

## 2024-12-03 RX ADMIN — SODIUM CHLORIDE, SODIUM LACTATE, POTASSIUM CHLORIDE, AND CALCIUM CHLORIDE: .6; .31; .03; .02 INJECTION, SOLUTION INTRAVENOUS at 06:30

## 2024-12-03 RX ADMIN — GLYCOPYRROLATE 0.6 MCG: 0.2 INJECTION INTRAMUSCULAR; INTRAVENOUS at 08:57

## 2024-12-03 RX ADMIN — PROMETHAZINE HYDROCHLORIDE 12.5 MG: 25 INJECTION INTRAMUSCULAR; INTRAVENOUS at 10:34

## 2024-12-03 RX ADMIN — BUPIVACAINE 20 ML: 13.3 INJECTION, SUSPENSION, LIPOSOMAL INFILTRATION at 07:15

## 2024-12-03 RX ADMIN — PROPOFOL 200 MG: 10 INJECTION, EMULSION INTRAVENOUS at 07:26

## 2024-12-03 RX ADMIN — BUPIVACAINE HYDROCHLORIDE 10 ML: 5 INJECTION, SOLUTION EPIDURAL; INTRACAUDAL; PERINEURAL at 07:15

## 2024-12-03 RX ADMIN — MIDAZOLAM 2 MG: 1 INJECTION INTRAMUSCULAR; INTRAVENOUS at 07:08

## 2024-12-03 RX ADMIN — LIDOCAINE HYDROCHLORIDE 5 ML: 20 INJECTION, SOLUTION EPIDURAL; INFILTRATION; INTRACAUDAL; PERINEURAL at 07:26

## 2024-12-03 RX ADMIN — ROCURONIUM BROMIDE 50 MG: 10 INJECTION, SOLUTION INTRAVENOUS at 07:26

## 2024-12-03 NOTE — ANESTHESIA PREPROCEDURE EVALUATION
Procedure:  TENODESIS BICEPS OPEN PROXIMAL (Right: Arm Upper)  ARTHROSCOPIC SUBACROMIAL DECOMPRESSION (Right: Shoulder)  REPAIR LABRUM (Right: Shoulder)    Relevant Problems   ANESTHESIA (within normal limits)        Physical Exam    Airway    Mallampati score: I  TM Distance: >3 FB  Neck ROM: full     Dental   No notable dental hx     Cardiovascular  Cardiovascular exam normal    Pulmonary  Pulmonary exam normal     Other Findings  post-pubertal.      Anesthesia Plan  ASA Score- 1     Anesthesia Type- general with ASA Monitors.         Additional Monitors:     Airway Plan:     Comment: I discussed risks (reviewed with patient on the anesthesia consent form), benefits and alternatives for General Anesthesia.  These risks did include breathing tube remaining in place if not strong enough, PONV, damage to lips and teeth, sore throat, eye injury or blindness, risk of heart attack or stroke that may lead to death.    I discussed with patient the risks(reviewed with patient on the anesthesia consent form), benefits and alternatives of regional anesthesia also including possibilities of infection, temporary paralysis and nerve injury.  I answered patient questions and obtained consent.  Planned regional anesthesia supraclavicular nerve block for postoperative pain control  .       Plan Factors-    Chart reviewed.    Patient summary reviewed.                  Induction- intravenous.    Postoperative Plan- Plan for postoperative opioid use.         Informed Consent- Anesthetic plan and risks discussed with patient.  I personally reviewed this patient with the CRNA. Discussed and agreed on the Anesthesia Plan with the CRNA..

## 2024-12-03 NOTE — ANESTHESIA PROCEDURE NOTES
Peripheral Block    Patient location during procedure: holding area  Start time: 12/3/2024 7:15 AM  Reason for block: at surgeon's request and post-op pain management  Staffing  Performed by: Akin Robbins MD  Authorized by: Akin Robbins MD    Preanesthetic Checklist  Completed: patient identified, IV checked, site marked, risks and benefits discussed, surgical consent, monitors and equipment checked, pre-op evaluation and timeout performed  Peripheral Block  Patient position: sitting  Prep: ChloraPrep  Patient monitoring: continuous pulse oximetry and frequent blood pressure checks  Block type: Supraclavicular  Laterality: right  Injection technique: single-shot  Procedures: ultrasound guided, Ultrasound guidance required for the procedure to increase accuracy and safety of medication placement and decrease risk of complications.  Ultrasound permanent image saved    Needle  Needle type: Stimuplex   Needle gauge: 22 G  Needle length: 2 in  Needle localization: ultrasound guidance  Assessment  Injection assessment: frequent aspiration, injected with ease, negative aspiration, no paresthesia on injection, no symptoms of intraneural/intravenous injection, negative for heart rate change, needle tip visualized at all times and incremental injection  Post-procedure:  site cleaned  patient tolerated the procedure well with no immediate complications

## 2024-12-03 NOTE — ANESTHESIA POSTPROCEDURE EVALUATION
Post-Op Assessment Note    CV Status:  Stable  Pain Score: 0    Pain management: adequate       Mental Status:  Alert and awake   Hydration Status:  Euvolemic   PONV Controlled:  Controlled   Airway Patency:  Patent     Post Op Vitals Reviewed: Yes    No anethesia notable event occurred.    Staff: CRNA           Last Filed PACU Vitals:  Vitals Value Taken Time   Temp 97.3 °F (36.3 °C) 12/03/24 0912   Pulse 58 12/03/24 0914   /57 12/03/24 0913   Resp 14 12/03/24 0912   SpO2 87 % 12/03/24 0914   Vitals shown include unfiled device data.    Modified Era:  No data recorded

## 2024-12-03 NOTE — DISCHARGE INSTR - AVS FIRST PAGE
Dr. Concepcion Labral Repair and Biceps Tenodesis    What to Expect/Activity     It is normal to have some discomfort in your shoulder for several days.     For the next 48 to 72 hours use?ice around the shoulder to help reduce the pain and swelling for 20-30 minutes every 1-2 hours while you are awake.     Place a pillow underneath your elbow when sitting to help reduce pressure on the shoulder.     Sleeping in an upright position for the first two days will help reduce swelling,?as well.?     You are non-weight bearing to your operative arm. Wear your sling at all times.     No pendulum exercises or active movement of shoulder joint.     We encourage finger, wrist, and elbow range of motion.?     Do not drive until instructed by Dr. Concepcion.     Dressing/Wound Care/Bathing     You may remove your dressing 3 days after surgery. Leave any steri-strips in place.     You may shower 3 days after surgery.?After showers, pat incisions and cover with band-aids. Change band-aids after showering.     No baths, swimming or submerging until discussed at your follow-up appointment.?       ?Pain Management/Medications     You may resume your usual medications.     You have been provided a prescription for narcotic pain medication. This is to be used only as needed for breakthrough pain that is not controlled with over-the-counter pain medication.     Please take the following medications:     Take Aspirin 81 mg twice daily for 30 days (blood clot prevention)     Naproxen and Tylenol sent to pharmacy, Naproxen 500 mg twice daily every 6-8 hours, Tylenol every 4-6 hours, these medications can be overlapped and alternated for first week, then can be used as needed    While taking your narcotic medication, an over-the-counter stool softener may be helpful to prevent constipation. Please consider taking Marie-Colace twice daily while taking this medication.     Nerve Block: If you received a nerve block today your surgical limb may feel  numb with loss of muscle control for up to 18-24 hours after surgery. We recommend taking a dose of pain medication prior to bed tonight, to cover any pain that may occur if your nerve block begins to wear off while you are sleeping.     ?Follow up/Call if:     The findings of your surgery will be explained to you and your family immediately after surgery. However, in the post-operative period, during recovery from anesthesia you may not fully remember or fully understand what was said. This will be explained once again when you return for your post-op appointment and suture removal.     If you were provided with photos of your procedure, please bring them to your follow up appointment for explanation.     Please contact Dr. Concepcion’s office if you experience the following:     Excessive bleeding (bleeding through your dressing)     Fever greater than 101 degrees F     Persistent nausea or vomiting     Decreased sensation or discoloration of the operative limb     Pain or swelling that is getting worse and not better with medication

## 2024-12-03 NOTE — OP NOTE
OPERATIVE REPORT  PATIENT NAME: Isha Jacobo    :  1990  MRN: [id number]  Pt Location: [facility] OR ROOM 03    SURGERY DATE: 12/3/2024    Surgeons and Role:     * North Concepcion, DO - Primary\     * Zachary Alberts Jr. MS, ATC - Assisting      Preop Diagnosis:  Superior glenoid labrum lesion of right shoulder, initial encounter [S43.431A]    Post-Op Diagnosis Codes:     * Superior glenoid labrum lesion of right shoulder, initial encounter [S43.431A]    Procedure(s):  Right - TENODESIS BICEPS ARTHROSCOPIC PROXIMAL  Right - ARTHROSCOPIC SUBACROMIAL DECOMPRESSION  Right - ARTHROSCOPIC REPAIR LABRUM    Specimen(s):  * No specimens in log *    Estimated Blood Loss:   Minimal    Drains:  * No LDAs found *    Anesthesia Type:   General w/ Regional    Operative Indications:  Superior glenoid labrum lesion of right shoulder, initial encounter [S43.431A]  The patient is a very active 34 year-old  who comes in to me with significant problems associated with right shoulder. After failure of conservative nonoperative care, eventually came and saw me, on physical examination with x-ray and radiographic findings, found to have a SLAP tear, impingement.  The risks and benefits of surgical intervention were explained including, but not exclusive to, infection, DVT, loss of range of motion, stiffness, continuance of pain, failure, fracture, dislocation, delayed union, malunion, nonunion, wound complications, and neurovascular compromise.      Operative Findings:  Type 2 SLAP with extension to the anterior labrum, 3 o clock position  Posterosuperior labral fraying  Small, non-engaging on track Hill sachs defect  Type 2 acromion, subacromial bursitis    Implant Name Type Inv. Item Serial No.  Lot No. LRB No. Used Action   ANCHOR SUT SLF BUNCHING KL 1.8MM FIBERTAK SHOULDER - [id number]  ANCHOR SUT SLF BUNCHING KL 1.8MM FIBERTAK SHOULDER  [employer] [id number] Right 1 Implanted   ANCHOR SUT SLF BUNCHING KL 1.8MM FIBERTAK  SHOULDER - [id number]  ANCHOR SUT SLF BUNCHING KL 1.8MM FIBERTAK SHOULDER  [employer]  Right 1 Implanted   ANCHOR SUT 4.75 X 19.1MM W/CLD EYELET SWIVELOCK STRL - [id number]  ANCHOR SUT 4.75 X 19.1MM W/CLD EYELET SWIVELOCK STRL  [employer] [id number] Right 1 Implanted       Complications:   None    Procedure and Technique:  Patient was seen and identified in the preoperative holding area.  The patient's identity was matched against the hospital wristband and chart.  The operative extremity was matched against the procedural consent on chart/imaging.  The patient had an opportunist questions and all questions were answered.  She was wheeled to the operating room where she was placed supine in a beachchair table and anesthesia was induced with an endotracheal tube.  After the tube was secured the patient was positioned upright in the beachchair position all bony prominences were padded.  She was prepped and draped in sterile fashion.    A formal timeout was performed.  We created a posterior viewing portal and the arthroscope was inserted.  A diagnostic arthroscopy was performed demonstrating a type II SLAP tear with extension to the anterosuperior labrum.  There is significant erythema and tearing in this region to indicate pathology.  The subscapularis was intact and the middle glenohumeral ligament.  Normal.  The biceps tendon itself demonstrated mild tenosynovitis without any tearing other than the type II SLAP tear as mentioned.  The underside of the rotator cuff demonstrated intact articular side of the supraspinatus.  There was a small, not engaging, on track Hill-Sachs defect that did not engage in abduction external rotation.  The axillary pouch was intact without any evidence of loose bodies.  The posterior labral demonstrate some fraying, especially posterior superiorly.    An anterior portal was created the rotator interval with a spinal needle and a 8 mm cannula was inserted.  We probed the anterior labral  tissue and determined that this tissue was appropriatel mobile.  Due to the significant disruption of the anterior superior labrum and decided proceed with anterior labral repair.  The area was debrided with a shaver and the cartilage was removed from the edge of the glenoid with the radiofrequency device.  We utilized a rasp and an elevator to mobilize the labral tissue.  We inserted an Arthrex 1.9 mm knotless fiber tack anchor at the 3 o'clock position.  This was then malleted into place and had excellent tension and fixation.  We then utilized a BirdBeak device to pass our repair stitch and complete her labral repair.  This was then provisionally tightened and the suture tail was saved.  We then passed a second anchor more proximally at the 2 o'clock position.  We repeated the process to shuttle the anchor.  The anchor was then shuttled sequentially.  After completion of our repair we felt there was continued instability of the biceps insertion at the site of the type II SLAP.  Due to the patient's age, chronicity of this injury, involvement in throwing sports, and preoperative discussion, we proceeded with an arthroscopic biceps tenodesis.  A suture tape fiber link was passed through the biceps and a loop and tack fashion.  We then utilized on all to insert a 4.75 mm x 23 mm bio swivel lock device along the top of the biceps groove.  This was done before releasing the biceps in order to optimize tension and avoid any Issac deformity.  The anchor was inserted and had excellent fixation.  After this the suture tails were cut flush and the dogear stitch was removed as there is no evidence of dogear.  We then performed a biceps tenotomy utilizing a radiofrequency device.    We debrided the posterior superior labrum and the fibrous tissue within the Hill-Sachs defect.  We then irrigated the shoulder extensively and moved the arthroscope to the subacromial space.  There was significant bursitis in the subacromial  space the limited visualization.  Spinal needle was used to create a lateral portal and a shaver was inserted.  We safely identified the undersurface of the acromion and work from normal to abnormal while we identified significantly abnormal bursal tissue.  A thorough and complete subacromial and subdeltoid bursectomy was performed.  The underlying rotator cuff demonstrated some minimal bursal sided fraying.  The anterior aspect of the acromion was inspected and the soft tissue was removed from the acromion.  A coracoacromial ligament peel was performed to identify that there was type II acromion with a curved edge.  We utilized the shaver on forward to debride the acromion back to a type I acromion utilizing a cutting block technique.  We then irrigated extensively and again inspected the rotator cuff determining no full-thickness or high-grade partial-thickness tear.  The shoulder was irrigated extensively and the arthroscope was removed.    The incisions were closed with 3-0 Monocryl in buried fashion and Steri-Strips were applied.  A sterile dressing was applied.  The patient was extubated and transported the PACU in stable condition.  She demonstrated a warm well-perfused arm with a brisk capillary refill and a 2+ radial pulse.    I was present for the entire procedure.    Patient Disposition:  PACU              SIGNATURE: North Concepcion DO  DATE: December 3, 2024  TIME: 9:16 AM

## 2024-12-03 NOTE — INTERVAL H&P NOTE
H&P reviewed. After examining the patient I find no changes in the patients condition since the H&P had been written.    Vitals:    12/03/24 0633   BP: 125/72   Pulse: 77   Resp: 16   Temp: (!) 97.1 °F (36.2 °C)   SpO2: 100%

## 2024-12-03 NOTE — H&P
Ortho Sports Medicine Shoulder New Patient Visit     Assesment:   34 y.o. female right shoulder SLAP tear, impingement syndrome     Plan:  Patient presents with 7 months of shoulder pain with some antecedent pain in the shoulder as well over the last several years.  She has symptomatic type II SLAP tear that has been not improving with 7 months of activity modification, rest, home directed exercise program, as well as formal physical therapy.  More recently she has been unable unable to tolerate formal physical therapy due to extent of her pain.  We discussed the pathophysiology of SLAP tears especially in a .  I recommended the possibility of ongoing nonoperative treatment with ultrasound-guided glenohumeral corticosteroid injection versus surgical intervention in the form of shoulder arthroscopy and labral repair, versus biceps tenodesis, and all associate procedures.  Patient is interested in proceeding surgical intervention as she is frustrated with her lack of improvement over the last several months and she worries a injection will be short-lived relief.  We mutually agreed to move forward with surgical intervention after risk-benefit and alternatives were discussed at length.       Given that the patient has failed conservative treatment and continues to have severe pain and/or dysfunction that limits their activities of daily living, they would like to proceed with operative intervention. We discussed with the patient the risks of no treatment, non-operative treatment, and operative treatment. The risks of operative intervention were discussed and include but are not limited to: Infection, bleeding, stiffness, loss of range of motion, blood clot, failure of surgery, fracture, delayed union, nonunion, malunion, risk of potential future arthritis, continued problems with swelling, injury to surrounding structures/nerve/artery/vein, recurrence of cysts, failure of hardware, retained hardware  and/or foreign body, symptomatic hardware, and continued instability, pain, dysfunction, or disability despite repair.         Conservative treatment:     Discussed with the patient that it's reasonable to try a localized cortisone injection via US with a dedicated course of PT. Patient wished to proceed with surgical intervention due to failing non operative treatments that she has completed over the last 7 months with continued pain.     Imaging:     All imaging from today was reviewed by myself and explained to the patient.         Injection:     No Injection planned at this time.       Surgery:     All of the risks and benefits of operative treatment were explained to the patient, as well as the risks and benefits of any alternative treatment options, including nonoperative care. This risks of surgery include, but are not limited to, infection, bleeding, blood clot, damage to nerves/arteries, need for further surgyer, cardiovascular risk, anesthesia risk, and continued pain.  The patient understood this and elects to proceed forward with surgical intervention.  We will proceed forward with surgical arthroscopy of the shoulder with SLAP repair with biceps tenodesis with all other associated procedures        Follow up:     Return for Follow up post op.                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       The patient denies a history of diabetes.  The patient denies a history of thyroid disorder.       Pain is located posterior, lateral.  The patient rates the pain as a 8/10.  The pain has been present for 8 months.       The patient denies specific right shoulder injury but states that she has been noticed some intermittent  lateral aspect right shoulder pain since March.  Patient states that she does  her daughters field hockey/softball team and does state that she was throwing a ball around and did feel some pain after that time.  Patient states that she was able to nurse the shoulder back in she was doing well.  Patient reports that after that time she had full range of motion and strength.  Patient reports that then in May 2024 she was throwing a waterlogged softball and when she felt a pop within the shoulder.  Patient reports that after that moment she was able to range the shoulder but states that she has had pain ever since.  Patient reports all of her pain is deep within the shoulder but it does radiate to the lateral and posterior aspect of the shoulder.  Patient also notes intermittent clicking within the shoulder with ROM since the injury. She also notes pain at end that will wake her form sleep.  The pain is characterized as sharp, stabbing, dull, achy.  The pain is present at all times.      Pain is improved by rest.  Pain is aggravated by overhead activity, reaching back, rotation, lifting , and exercising.    Symptoms include clicking and catching.    The patient denies weakness.  The patient denies numbness and tingling.     The patient has tried rest, ice, NSAIDS, and physical therapy.            Shoulder Surgical History:  None     Past Medical, Social and Family History:  Medical History        Past Medical History:   Diagnosis Date    Exercises 5 to 6 times per week      PONV (postoperative nausea and vomiting)       with C Sections//and dizziness    Wears glasses       and contacts         Surgical History         Past Surgical History:   Procedure Laterality Date     SECTION         x2    FL INJECTION RIGHT SHOULDER (ARTHROGRAM)   10/9/2024    TX MASTOPEXY Bilateral 2020     Procedure: BREAST MASTOPEXY;  Surgeon: Kashif Grande MD;  Location: [facility];  Service: Plastics    [facility]  EXTRACTION             Allergies        Allergies   Allergen Reactions    Bactrim [Sulfamethoxazole-Trimethoprim] Hives         Medications Ordered Prior to Encounter          Current Outpatient Medications on File Prior to Visit   Medication Sig Dispense Refill    Multiple Vitamin (MULTI-VITAMIN PO) Take by mouth        fluticasone (FLONASE) 50 mcg/act nasal spray 2 sprays into each nostril daily (Patient not taking: Reported on 9/10/2024) 16 g 0    [DISCONTINUED] benzonatate (TESSALON) 200 MG capsule Take 1 capsule (200 mg total) by mouth 3 (three) times a day as needed for cough (Patient not taking: Reported on 9/10/2024) 20 capsule 0    [DISCONTINUED] norethindrone-ethinyl estradiol (JUNEL FE 1/20) 1-20 MG-MCG per tablet Take 1 tablet by mouth every evening (Patient not taking: Reported on 9/10/2024)          No current facility-administered medications on file prior to visit.         Social History               Socioeconomic History    Marital status: /Civil Union       Spouse name: Not on file    Number of children: Not on file    Years of education: Not on file    Highest education level: Not on file   Occupational History    Not on file   Tobacco Use    Smoking status: Never    Smokeless tobacco: Never   Vaping Use    Vaping status: Never Used   Substance and Sexual Activity    Alcohol use: Yes       Comment: minimal    Drug use: Never    Sexual activity: Not on file   Other Topics Concern    Not on file   Social History Narrative    Not on file      Social Determinants of Health      Financial Resource Strain: Not on file   Food Insecurity: Not on file   Transportation Needs: Not on file   Physical Activity: Not on file   Stress: Not on file   Social Connections: Not on file   Intimate Partner Violence: Not on file   Housing Stability: Not on file               I have reviewed the past medical, surgical, social and family history, medications and allergies as documented in the EMR.    Review of  "systems: ROS is negative other than that noted in the HPI.  Constitutional: Negative for fatigue and fever.   HENT: Negative for sore throat.    Respiratory: Negative for shortness of breath.    Cardiovascular: Negative for chest pain.   Gastrointestinal: Negative for abdominal pain.   Endocrine: Negative for cold intolerance and heat intolerance.   Genitourinary: Negative for flank pain.   Musculoskeletal: Negative for back pain.   Skin: Negative for rash.   Allergic/Immunologic: Negative for immunocompromised state.   Neurological: Negative for dizziness.   Psychiatric/Behavioral: Negative for agitation.       Physical Exam:     Blood pressure 114/75, pulse 69, height 5' 1\" (1.549 m), weight 49.9 kg (110 lb), not currently breastfeeding.     General/Constitutional: NAD, well developed, well nourished  HENT: Normocephalic, atraumatic  CV: Intact distal pulses, regular rate  Resp: No respiratory distress or labored breathing  Lymphatic: No lymphadenopathy palpated  Neuro: Alert and Oriented x 3, no focal deficits  Psych: Normal mood, normal affect, normal judgement, normal behavior  Skin: Warm, dry, no rashes, no erythema     RIGHT  Shoulder focused exam:     Visual inspection of the shoulder demonstrates normal contour without atrophy  No evidence of scapular dyskinesia or atrophy.  No scapular winging  Active and passive range of motion demonstrates forward flexion to 180, abduction to 150 with pain, extension of N/A, external rotation is approximately 90 with the arm in 90° of abduction, external rotation is 90 with the arm the side, internal rotation to Sacrum   Rotator cuff strength is 5/5 to resisted forward flexion, 4+/5 resisted abduction, 5/5 resisted external rotation, NA resisted internal rotation  No tenderness to palpation at the distal clavicle, AC joint, acromion, or scapular spine  No pain with cross-body adduction  Negative Neer's test, negative modified Amor impingement test  Negative external " rotation lag sign  Negative belly press, negative lift-off  + speed's and Yergason's test  + tenderness to palpation at the bicipital groove  + Thibodaux's test alleviate  + pain Abduction external rotation              UE NV Exam: +2 Radial pulses bilaterally  Sensation intact to light touch C5-T1 bilaterally, Radial/median/ulnar nerve motor intact       Bilateral elbow, wrist, and and forearm ROM full, painless with passive ROM, no ttp or crepitance throughout extremities below shoulder joint     Cervical ROM is full without pain, numbness or tingling        Shoulder Imaging     X-rays of the right shoulder were reviewed from 9/10/2024, which demonstrate No acute osseous abnormality.  Small calcific density adjacent the humeral head, consistent with calcific tendinitis..  I have reviewed the radiology report and agree with their impression.     MRI of the right shoulder were reviewed from 10/9/2024, which demonstrate Type II SLAP tear.     Small Hill-Sachs fracture deformity likely chronic.     Small linear calcific tendinosis seen on the prior radiograph is difficult to visualize on this MRI study..  I have reviewed the radiology report and agree with their impression.        Scribe Attestation       I,:  Hodan Hylton am acting as a scribe while in the presence of the attending physician.:       I,:  North Concepcion, DO personally performed the services described in this documentation    as scribed in my presence.:

## 2024-12-04 NOTE — ANESTHESIA POSTPROCEDURE EVALUATION
Post-Op Assessment Note    CV Status:  Stable    Pain management: adequate       Mental Status:  Alert and awake   Hydration Status:  Euvolemic   PONV Controlled:  Controlled   Airway Patency:  Patent     Post Op Vitals Reviewed: Yes    No anethesia notable event occurred.    Staff: Anesthesiologist           Last Filed PACU Vitals:  Vitals Value Taken Time   Temp 97.5 °F (36.4 °C) 12/03/24 0955   Pulse 56 12/03/24 1110   /61 12/03/24 0955   Resp 16 12/03/24 1110   SpO2 100 % 12/03/24 1110       Modified Era:  Activity: 2 (12/3/2024  9:55 AM)  Respiration: 2 (12/3/2024  9:55 AM)  Circulation: 2 (12/3/2024  9:55 AM)  Consciousness: 2 (12/3/2024  9:55 AM)  Oxygen Saturation: 2 (12/3/2024  9:55 AM)  Modified Era Score: 10 (12/3/2024  9:55 AM)

## 2024-12-11 ENCOUNTER — EVALUATION (OUTPATIENT)
Dept: PHYSICAL THERAPY | Facility: CLINIC | Age: 34
End: 2024-12-11
Payer: COMMERCIAL

## 2024-12-11 ENCOUNTER — OFFICE VISIT (OUTPATIENT)
Dept: OBGYN CLINIC | Facility: CLINIC | Age: 34
End: 2024-12-11

## 2024-12-11 VITALS — BODY MASS INDEX: 20.58 KG/M2 | HEIGHT: 61 IN | WEIGHT: 109 LBS

## 2024-12-11 DIAGNOSIS — S43.431D SUPERIOR GLENOID LABRUM LESION OF RIGHT SHOULDER, SUBSEQUENT ENCOUNTER: Primary | ICD-10-CM

## 2024-12-11 DIAGNOSIS — M75.41 SHOULDER IMPINGEMENT SYNDROME, RIGHT: ICD-10-CM

## 2024-12-11 DIAGNOSIS — Z98.890 S/P ARTHROSCOPY OF RIGHT SHOULDER: Primary | ICD-10-CM

## 2024-12-11 DIAGNOSIS — S43.431A LABRAL TEAR OF SHOULDER, RIGHT, INITIAL ENCOUNTER: ICD-10-CM

## 2024-12-11 PROCEDURE — 99024 POSTOP FOLLOW-UP VISIT: CPT | Performed by: STUDENT IN AN ORGANIZED HEALTH CARE EDUCATION/TRAINING PROGRAM

## 2024-12-11 PROCEDURE — 97161 PT EVAL LOW COMPLEX 20 MIN: CPT | Performed by: PHYSICAL THERAPIST

## 2024-12-11 NOTE — PROGRESS NOTES
Shoulder Post Operative Visit     Assesment:     34 y.o. female 8 days surgical Arthroscopy of the right shoulder with labral repair, arthroscopic biceps tenodesis, and subacromial decompression, DOS: 12/3/2024    Plan:  Isha presents today for first postop visit.  Unfortunately, she has suffered a blister on both the dorsal and palmar aspects of her right thumb that I feel may be related to a pressure injury from positioning versus within her sling.  I recommend continued observation with ice, elevation, and anti-inflammatory medications.  I would like to see her back for a wound check in approximately 2 weeks to reevaluate.  She is scheduled to begin physical therapy today which will progress according to our protocol.  She should remain in the sling however can remove several times a day to begin pendulum exercises, elbow wrist and hand motion.    Post-Operative treatment:    Ice to shoulder 1-2 times daily, for 20 minutes at a time.  PT for ROM and strengthening to shoulder, rotator cuff, scapular stabilizers.    Imaging:    All imaging from today was reviewed by myself and explained to the patient.     Sling:  at all times other than showering    DVT Prophylaxis:  Aspirin 81 mg oral twice daily x 3 weeks and Ambulation    Follow up:   2 weeks     Patient was advised that if they have any fevers, chills, chest pain, shortness of breath, redness or drainage from the incision, please let our office know immediately.        Chief Complaint   Patient presents with    Right Shoulder - Post-op     SX 12/03/24    Right Thumb - Swelling, Pain     blister       History of Present Illness:    The patient is a 34 y.o. female who is being evaluated post operatively 1 week  status post right shoulder arthroscopy with labral repair, arthroscopic biceps tenodesis, and subacromial decompression..    Since the prior visit, She reports significant improvement.     Pain is well controlled.  The patient is using ice to control  "swelling.  She does complain of a blister along the thumb that she noticed several days ago.  She reports this is improving in severity over the last 1 to 2 days.  She does have a hard time using the hand and squeezing objects with a hand.  She denies any motor weakness or paresthesias.  Denies any fevers or chills.    They have not started physical therapy but is scheduled to begin tonight.     The patient is ambulating for DVT ppx.    The patient is using their sling at all times other than showering    The patient denies any fevers, chills, calf pain, chest pain/shortness of breath, redness or drainage from the incision.         I have reviewed the past medical, surgical, social and family history, medications and allergies as documented in the EMR.    Review of systems: ROS is negative other than that noted in the HPI.  Constitutional: Negative for fatigue and fever.       Physical Exam:    Height 5' 1\" (1.549 m), weight 49.4 kg (109 lb), not currently breastfeeding.    General/Constitutional: NAD, well developed, well nourished  HENT: Normocephalic, atraumatic  CV: Intact distal pulses, regular rate  Resp: No respiratory distress or labored breathing  Lymphatic: No lymphadenopathy palpated  Neuro: Alert and Oriented x 3, no focal deficits  Psych: Normal mood, normal affect, normal judgement, normal behavior  Skin: Warm, dry, no rashes, no erythema    Shoulder focused exam:        Visual specks of the right shoulder demonstrates healing incisions.  Steri-Strips are removed  Passive range of motion demonstrates forward flexion to 100 degrees, abduction to 60, external rotation to neutral with the arm at side, internal Tatian not tested  Biceps contour symmetric to the contralateral side  Visual inspection of the patient's thumb demonstrates a healing blister over the dorsal aspect of the thumb without any associated erythema  There is generalized swelling of the thumb and the remainder of the digits  Along the " palmar aspect of the thumb there is a large blister extending from the tip of the digit towards the base of the proximal phalanx   there is some fluid within the blister however it does not appear purulent  Or erythematous        UE NV Exam: +2 Radial pulses bilaterally  Sensation intact to light touch C5-T1 bilaterally, Radial/median/ulnar nerve motor intact      Scribe Attestation      I,:   am acting as a scribe while in the presence of the attending physician.:       I,:   personally performed the services described in this documentation    as scribed in my presence.:

## 2024-12-11 NOTE — PROGRESS NOTES
PT Evaluation     Today's date: 2024  Patient name: Isha Jacobo  : 1990  MRN: [id number]  Referring provider: Beatriz Joe PA-C  Dx:   Encounter Diagnosis     ICD-10-CM    1. S/P arthroscopy of right shoulder  Z98.890 Ambulatory Referral to Physical Therapy                     Assessment    Assessment details: Pt is a 34 y.o. female who presents to outpatient PT s/p R shoulder labral repair/biceps tenodesis with pain, decreased rom, decreased strength and decreased functional mobility. She will benefit from skilled PT to address these deficits in order to achieve her goals and maximize her functional mobility.           Goals  Short Term Goals:   Independent performance of initial hep  Decrease pain 2 points on VAS      Long Term Goals:  Independent performance of comprehensive hep  Work performance is returned to max level of function  Performance of IADL's is returned to max level of function  Performance in recreational activities is improved to max level of function  Able to reach overhead with min pain  Full return to coaching youth sports.      Plan    Planned therapy interventions: manual therapy, strengthening, stretching, therapeutic activities, therapeutic exercise, home exercise program, IADL retraining and therapeutic training    Frequency: 2x week  Duration in weeks: 12        Subjective Evaluation    History of Present Illness  Mechanism of injury: Pt underwent R shoulder labral repair/biceps tenodesis on 12/3/24. Her pain is well managed but she continues to have difficulty sleeping at night, is most comfortable in a reclined position. Reports that since the surgery the she has been experiencing R thump swelling and blistering. Reports that her surgeon is aware of this and has a f/u for this concern on 24.  She is R handed and is currently limited in most IADL's secondary to post-op restrictions.  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    Patient Goals  Patient goals for therapy: decreased pain, increased motion, increased strength, independence with ADLs/IADLs, return to sport/leisure activities and return to work    Pain  Current pain ratin  At worst pain ratin              Objective    AROM: NOT TESTED   Flexion:   Abduction:       PROM:   Flexion: 60   Abduction:   ER: 8   IR: to body    Strength: NOT TESTED   Flexion:  /5   Abduction:   /5   ER:    /5   IR:     /5      Portal sites are clean and closed  Noted edema and blistering or R thumb, slight TTP  Slight was slightly adjusted for comfort but is otherwise well positioned                          Precautions: SLAP repair and biceps tenodesis 12/3/24      Manuals             prom kl                                                   Neuro Re-Ed                                                                                                        Ther Ex             Scap retraction             pendulums             Submax Isometric abd/ext/ir/er                                                                              Ther Activity                                       Gait Training                                       Modalities             Cp prn

## 2024-12-16 ENCOUNTER — OFFICE VISIT (OUTPATIENT)
Dept: PHYSICAL THERAPY | Facility: CLINIC | Age: 34
End: 2024-12-16
Payer: COMMERCIAL

## 2024-12-16 DIAGNOSIS — Z98.890 S/P ARTHROSCOPY OF RIGHT SHOULDER: Primary | ICD-10-CM

## 2024-12-16 PROCEDURE — 97110 THERAPEUTIC EXERCISES: CPT | Performed by: PHYSICAL THERAPIST

## 2024-12-16 PROCEDURE — 97140 MANUAL THERAPY 1/> REGIONS: CPT | Performed by: PHYSICAL THERAPIST

## 2024-12-16 NOTE — PROGRESS NOTES
"Daily Note     Today's date: 2024  Patient name: Isha Jacobo  : 1990  MRN: [id number]  Referring provider: Beatriz Joe PA-C  Dx: No diagnosis found.               Subjective: pt reports compliance with her hep and that she is having no difficulty with it. Reports that her thumb continues to be painful, has f/u next week for this.        Objective: See treatment diary below      Assessment: initiated tx as listed. Pt has good tolerance to prom and is meeting rom per protocol.  She is comfortable t/o therex. Instructed pt to add pendulums to hep.  If no sig soreness after today visit plan to issue isometrics as hep NV.       Plan: Continue per plan of care.      Precautions: SLAP repair and biceps tenodesis 12/3/24      Manuals            prom kl kl                                                  Neuro Re-Ed                                                                                                        Ther Ex             Scap retraction  5\"x20           pendulums  x20           Submax Isometric abd/ext/ir/er  5\"x10ea                                                                            Ther Activity                                       Gait Training                                       Modalities             Cp prn                               "

## 2024-12-18 ENCOUNTER — OFFICE VISIT (OUTPATIENT)
Dept: PHYSICAL THERAPY | Facility: CLINIC | Age: 34
End: 2024-12-18
Payer: COMMERCIAL

## 2024-12-18 DIAGNOSIS — Z98.890 S/P ARTHROSCOPY OF RIGHT SHOULDER: Primary | ICD-10-CM

## 2024-12-18 PROCEDURE — 97140 MANUAL THERAPY 1/> REGIONS: CPT | Performed by: PHYSICAL THERAPIST

## 2024-12-18 PROCEDURE — 97110 THERAPEUTIC EXERCISES: CPT | Performed by: PHYSICAL THERAPIST

## 2024-12-18 NOTE — PROGRESS NOTES
"Daily Note     Today's date: 2024  Patient name: Isha Jacobo  : 1990  MRN: [id number]  Referring provider: Beatriz Joe PA-C  Dx:   Encounter Diagnosis     ICD-10-CM    1. S/P arthroscopy of right shoulder  Z98.890                      Subjective:pt reports that her shoulder felt \"looser\" after her LV but then tightened again the next day. Reports compliance with her hep.     Objective: See treatment diary below      Assessment:  rom is progressing. Instructed pt on table slides for shoulder flexion. Instructed her to add this to her hep but keep it in a painfree rom. Continues to focus on restoring rom NV.        Plan: Continue per plan of care.      Precautions: SLAP repair and biceps tenodesis 12/3/24      Manuals           prom kl kl kl                                                 Neuro Re-Ed                                                                                                        Ther Ex             Scap retraction  5\"x20 5\"x20          pendulums  x20 x20          Submax Isometric abd/ext/ir/er  5\"x10ea 5\"x10          Table slides   Flexion 5\"x10                                                              Ther Activity                                       Gait Training                                       Modalities             Cp prn                               "

## 2024-12-23 ENCOUNTER — OFFICE VISIT (OUTPATIENT)
Dept: PHYSICAL THERAPY | Facility: CLINIC | Age: 34
End: 2024-12-23
Payer: COMMERCIAL

## 2024-12-23 DIAGNOSIS — Z98.890 S/P ARTHROSCOPY OF RIGHT SHOULDER: Primary | ICD-10-CM

## 2024-12-23 PROCEDURE — 97110 THERAPEUTIC EXERCISES: CPT | Performed by: PHYSICAL THERAPIST

## 2024-12-23 PROCEDURE — 97140 MANUAL THERAPY 1/> REGIONS: CPT | Performed by: PHYSICAL THERAPIST

## 2024-12-23 NOTE — PROGRESS NOTES
"Daily Note     Today's date: 2024  Patient name: Isha Jacobo  : 1990  MRN: [id number]  Referring provider: Beatriz Joe PA-C  Dx:   Encounter Diagnosis     ICD-10-CM    1. S/P arthroscopy of right shoulder  Z98.890                      Subjective: pt report only min pain and does not require medication at this time. pt reports compliance with her sling wear and performing her hep. Reports that she has a f/u with ortho on .Reports that she is not having sig pain at her hand but has concerns about lack of sensation at her thumb        Objective: See treatment diary below      Assessment:  rom continues to improve steadily. She is initially gaurded during manual tx but this improves nicely with reps.  Reports \"good stretch\" t/o manual tx but no sig pain.  No sig pain reported t/o isometrics. Continue to focus restoring rom NV.        Plan: Continue per plan of care.      Precautions: SLAP repair and biceps tenodesis 12/3/24      Manuals          prom kl kl kl kl                                                Neuro Re-Ed                                                                                                        Ther Ex             Scap retraction  5\"x20 5\"x20          pendulums  x20 x20 x20         Submax Isometric abd/ext/ir/er  5\"x10ea 5\"x10 5\"x10ea         Table slides   Flexion 5\"x10 5\"10                                                             Ther Activity                                       Gait Training                                       Modalities             Cp prn                               "

## 2024-12-26 ENCOUNTER — OFFICE VISIT (OUTPATIENT)
Dept: OBGYN CLINIC | Facility: CLINIC | Age: 34
End: 2024-12-26

## 2024-12-26 VITALS — HEIGHT: 61 IN | WEIGHT: 109 LBS | BODY MASS INDEX: 20.58 KG/M2

## 2024-12-26 DIAGNOSIS — S43.431D SUPERIOR GLENOID LABRUM LESION OF RIGHT SHOULDER, SUBSEQUENT ENCOUNTER: Primary | ICD-10-CM

## 2024-12-26 PROCEDURE — 99024 POSTOP FOLLOW-UP VISIT: CPT | Performed by: STUDENT IN AN ORGANIZED HEALTH CARE EDUCATION/TRAINING PROGRAM

## 2024-12-26 NOTE — PROGRESS NOTES
Shoulder Post Operative Visit     Assesment:     34 y.o. female 3 weeks status post right shoulder arthroscopy with labral repair, arthroscopic biceps tenodesis, and subacromial decompression performed on 12/30/2024    Plan:  The patient's diagnosis and treatment were discussed at length today. We discussed no treatment, non-operative treatment, and operative treatment.    Isha returns today for her second postoperative visit and for wound check regarding her right thumb.  She continues to progress well in regards to her right shoulder and is making appropriate progress according to the protocol.  I would like her to remain in the sling for 1 additional week before removing around the home and transitioning out of it.  I advised her to continue to wear the sling when out in public and around crowds.  In regards to her right thumb, she has some improvement in the appearance of her blister.  I recommended soaks in warm water and emphasis on range of motion both at home and in physical therapy to avoid any thumb stiffness.  I explained the skin over the volar thumb will continue to regenerate.  I did discuss the possibility of an appointment with one of our hand surgeons, we briefly discussed this case and reviewed the images.  Patient feels comfortable continuing with soaks and observation for now.  I will see her back in approximately 4 weeks for reevaluation.    Post-Operative treatment:    Ice to shoulder 1-2 times daily, for 20 minutes at a time.  Continue outpatient physical therapy according to protocol.  Activity restrictions per protocol.    Imaging:    All imaging from today was reviewed by myself and explained to the patient.     Sling: Per protocol    DVT Prophylaxis:  Aspirin 81 mg oral twice daily x 1 weeks    Follow up:   3 weeks    Patient was advised that if they have any fevers, chills, chest pain, shortness of breath, redness or drainage from the incision, please let our office know immediately.   "      Chief Complaint   Patient presents with    Right Shoulder - Post-op       History of Present Illness:    The patient is a 34 y.o. female who is being evaluated post operatively 3 weeks  status post right shoulder arthroscopy with labral repair, arthroscopic biceps tenodesis, and subacromial decompression performed on 12/3/2024.  Isha overall is doing well.  She reports overdoing it yesterday and having some increased pain in the shoulder.  She reports she was more active than usual around the house for Ben plus she was doing some exercising upon her seated exercise bike.  She report soreness and achiness in the shoulder.  In regards to her thumb she has some ongoing numbness and discomfort in the thumb, however her pain is significantly improved from prior visit.  She has been soaking her thumb in warm water and peeling some of the dying skin herself.         I have reviewed the past medical, surgical, social and family history, medications and allergies as documented in the EMR.    Review of systems: ROS is negative other than that noted in the HPI.  Constitutional: Negative for fatigue and fever.       Physical Exam:    Height 5' 1\" (1.549 m), weight 49.4 kg (109 lb), not currently breastfeeding.    General/Constitutional: NAD, well developed, well nourished  HENT: Normocephalic, atraumatic  CV: Intact distal pulses, regular rate  Resp: No respiratory distress or labored breathing  Lymphatic: No lymphadenopathy palpated  Neuro: Alert and Oriented x 3, no focal deficits  Psych: Normal mood, normal affect, normal judgement, normal behavior  Skin: Warm, dry, no rashes, no erythema    Shoulder focused exam:        Visual inspection of the shoulder demonstrates normal contour without atrophy  Shoulder range of motion demonstrates forward flexion to 90, abduction to 75, external Tatian to 20 with the arm the side, internal Tatian not tested  Motor and sensory function are both intact  Incisions are " healing well    Visual inspection of the thumb demonstrates discoloration/ecchymosis of the thumb with hyperpigmentation of the blistering skin on the volar aspect of the thumb  The skin in this region is demonstrating signs of necrosis however there is healthy pink skin regenerating deep to this tissue  She has some slightly decreased sensation at the tip of the thumb  Thumb range of motion is slightly diminished secondary to the thickened skin      UE NV Exam: +2 Radial pulses bilaterally  Sensation intact to light touch C5-T1 bilaterally, Radial/median/ulnar nerve motor intact      Scribe Attestation      I,:  Zachary Alberts am acting as a scribe while in the presence of the attending physician.:       I,:  North Concepcion, DO personally performed the services described in this documentation    as scribed in my presence.:

## 2024-12-27 ENCOUNTER — OFFICE VISIT (OUTPATIENT)
Dept: PHYSICAL THERAPY | Facility: CLINIC | Age: 34
End: 2024-12-27
Payer: COMMERCIAL

## 2024-12-27 DIAGNOSIS — Z98.890 S/P ARTHROSCOPY OF RIGHT SHOULDER: Primary | ICD-10-CM

## 2024-12-27 PROCEDURE — 97140 MANUAL THERAPY 1/> REGIONS: CPT | Performed by: PHYSICAL THERAPIST

## 2024-12-27 PROCEDURE — 97110 THERAPEUTIC EXERCISES: CPT | Performed by: PHYSICAL THERAPIST

## 2024-12-27 NOTE — PROGRESS NOTES
"Daily Note     Today's date: 2024  Patient name: Isha Jacobo  : 1990  MRN: [id number]  Referring provider: Beatriz Joe PA-C  Dx:   Encounter Diagnosis     ICD-10-CM    1. S/P arthroscopy of right shoulder  Z98.890                      Subjective: pt reports that her surgeon is pleased with progress and would like her to continue PT at this time. Instructed her to begin weaning from sling in 1 week (25). Reports they discussed referral to hand surgeon regarding her thumb but since in seems to be improving she will continue to soak he thumb and work on rom.  Reports she \"overdid it\" over the holiday so she is more sore today then usually.          Objective: See treatment diary below      Assessment:  pt is more sore t/o manual tx today, deferred isometrics secondary to soreness. Instructed pt to continue with table slides, scap squeezes and pendulums but to decrease the intensity and to hold on isometrics until soreness subsides.        Plan: Continue per plan of care.      Precautions: SLAP repair and biceps tenodesis 12/3/24      Manuals         prom kl kl kl kl kl                                               Neuro Re-Ed                                                                                                        Ther Ex             Scap retraction  5\"x20 5\"x20  5\"x20        pendulums  x20 x20 x20 x20        Submax Isometric abd/ext/ir/er  5\"x10ea 5\"x10 5\"x10ea nv        Table slides   Flexion 5\"x10 5\"10                                                             Ther Activity                                       Gait Training                                       Modalities             Cp prn                               "

## 2024-12-30 ENCOUNTER — APPOINTMENT (OUTPATIENT)
Dept: PHYSICAL THERAPY | Facility: CLINIC | Age: 34
End: 2024-12-30
Payer: COMMERCIAL

## 2024-12-31 ENCOUNTER — OFFICE VISIT (OUTPATIENT)
Dept: PHYSICAL THERAPY | Facility: CLINIC | Age: 34
End: 2024-12-31
Payer: COMMERCIAL

## 2024-12-31 DIAGNOSIS — Z98.890 S/P ARTHROSCOPY OF RIGHT SHOULDER: Primary | ICD-10-CM

## 2024-12-31 PROCEDURE — 97110 THERAPEUTIC EXERCISES: CPT

## 2024-12-31 PROCEDURE — 97140 MANUAL THERAPY 1/> REGIONS: CPT

## 2024-12-31 NOTE — PROGRESS NOTES
"Daily Note     Today's date: 2024  Patient name: Isha Jacobo  : 1990  MRN: [id number]  Referring provider: Beatriz Joe PA-C  Dx:   Encounter Diagnosis     ICD-10-CM    1. S/P arthroscopy of right shoulder  Z98.890           Start Time: 1150  Stop Time: 1220  Total time in clinic (min): 30 minutes      Subjective: Patient reports her right shoulder is feeling \"a little sore.\"      Objective: See treatment diary below.      Assessment: Patient able to comfortably resume shoulder isometrics today.  Patient would benefit from continued PT to restore right shoulder ROM and strength.      Plan: Continue treatment as per PT plan of care.       Precautions: SLAP repair and biceps tenodesis 12/3/24      Manuals        prom kl kl kl kl kl JLW                                              Neuro Re-Ed                                                                                                        Ther Ex             Scap retraction  5\"x20 5\"x20  5\"x20        pendulums  x20 x20 x20 x20        Submax Isometric abd/ext/ir/er  5\"x10ea 5\"x10 5\"x10ea nv 5\"x10 ea       Table slides   Flexion 5\"x10 5\"10  flex  5\"x20                                                           Ther Activity                                       Gait Training                                       Modalities             CP prn                                 "

## 2025-01-03 ENCOUNTER — OFFICE VISIT (OUTPATIENT)
Dept: PHYSICAL THERAPY | Facility: CLINIC | Age: 35
End: 2025-01-03
Payer: COMMERCIAL

## 2025-01-03 DIAGNOSIS — Z98.890 S/P ARTHROSCOPY OF RIGHT SHOULDER: Primary | ICD-10-CM

## 2025-01-03 PROCEDURE — 97140 MANUAL THERAPY 1/> REGIONS: CPT | Performed by: PHYSICAL THERAPIST

## 2025-01-03 PROCEDURE — 97110 THERAPEUTIC EXERCISES: CPT | Performed by: PHYSICAL THERAPIST

## 2025-01-03 NOTE — PROGRESS NOTES
"Daily Note     Today's date: 1/3/2025  Patient name: Isha Jacobo  : 1990  MRN: [id number]  Referring provider: Beatriz Joe PA-C  Dx:   No diagnosis found.                   Subjective: Patient reports that she continues to be a little sore, reports that she has started to wean from her brace per her surgeons instruction.     Objective: See treatment diary below.      Assessment: progressed per protocol as listed. Pt reports slight soreness but that it is tolerable. Instructed pt to add supine wand flexion to her hep 2-3x's/day.        Plan: Continue treatment as per PT plan of care.       Precautions: SLAP repair and biceps tenodesis 12/3/24      Manuals 12/11 12/16 12/18 12/23 12/27 12/31 1/3      prom kl kl kl kl kl JLW kl                                             Neuro Re-Ed                                                                                                        Ther Ex             Scap retraction  5\"x20 5\"x20  5\"x20        pendulums  x20 x20 x20 x20        Submax Isometric abd/ext/ir/er  5\"x10ea 5\"x10 5\"x10ea nv 5\"x10 ea Ir/er 10ea      Table slides   Flexion 5\"x10 5\"10  flex  5\"x20       Pulleys flex/scap       5\"x20      Supine cane flexion       5\"x20      Tb ir/er       Comfortabl e rom NV      Prone rows       x20      Ther Activity                                       Gait Training                                       Modalities             CP prn                                 "

## 2025-01-06 ENCOUNTER — EVALUATION (OUTPATIENT)
Dept: PHYSICAL THERAPY | Facility: CLINIC | Age: 35
End: 2025-01-06
Payer: COMMERCIAL

## 2025-01-06 ENCOUNTER — TELEPHONE (OUTPATIENT)
Dept: OBGYN CLINIC | Facility: HOSPITAL | Age: 35
End: 2025-01-06

## 2025-01-06 DIAGNOSIS — Z98.890 S/P ARTHROSCOPY OF RIGHT SHOULDER: Primary | ICD-10-CM

## 2025-01-06 PROCEDURE — 97140 MANUAL THERAPY 1/> REGIONS: CPT | Performed by: PHYSICAL THERAPIST

## 2025-01-06 PROCEDURE — 97110 THERAPEUTIC EXERCISES: CPT | Performed by: PHYSICAL THERAPIST

## 2025-01-06 NOTE — TELEPHONE ENCOUNTER
Hello,    Please advise if a forced appointment can be accommodated for the patient:    Call back #: [phone]    Insurance: [employer]     Reason for appt: Patient had Shoulder surgery on 12/3  with Dr. North Concepcion and he is referring patient to see Dr. Kenney for R thumb wound , decreased ROM and numbness.   First available in both [place] and [place] is March.      Requested doctor and/or location: [facility] / [place] or [place]       Thank you.

## 2025-01-06 NOTE — PROGRESS NOTES
PT Re-Evaluation     Today's date: 2025  Patient name: Isha Jacobo  : 1990  MRN: [id number]  Referring provider: Beatriz Joe PA-C  Dx:   Encounter Diagnosis     ICD-10-CM    1. S/P arthroscopy of right shoulder  Z98.890                        Assessment    Assessment details: Pt is being treated with outpatient PT. She has  made good gains in rom, strength, pain reduction and functional mobility but continues to have deficits. She will benefit from continued skilled PT to address these deficits and to progress through her post-op protocol.  Thank you for this referral.            Goals  Short Term Goals:   Independent performance of initial hep-met  Decrease pain 2 points on VAS-met      Long Term Goals:  Independent performance of comprehensive hep-ongoing  Work performance is returned to max level of function-ongoing  Performance of IADL's is returned to max level of function-ongoing  Performance in recreational activities is improved to max level of function-not met  Able to reach overhead with min pain-ongoing  Full return to coaching youth sports.      Plan    Planned therapy interventions: manual therapy, strengthening, stretching, therapeutic activities, therapeutic exercise, home exercise program, IADL retraining and therapeutic training    Frequency: 2x week  Duration in weeks: 8        Subjective Evaluation    History of Present Illness  Mechanism of injury: Pt underwent R shoulder labral repair/biceps tenodesis on 12/3/24. Pt feels she is improving but continues to have sig deficits. She has successfully transitioned away from her post-op brace. Reports that she is currently limited in her ability to reach above shoulder height and behind her head during self-care activities. Has not attempted athletic per instruction.       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    Patient Goals  Patient goals for therapy:  "decreased pain, increased motion, increased strength, independence with ADLs/IADLs, return to sport/leisure activities and return to work    Pain  Current pain ratin  At best pain ratin  At worst pain ratin              Objective    AROM:    Flexion: 140   Abduction: 123  Slight shoulder shrug noted       PROM:   Flexion: 155   Abduction:150   ER: 50   IR: 25    Strength:    Flexion: 3+ /5   Abduction:  3+ /5   ER:    4-/5   IR:     4-/5               Precautions: SLAP repair and biceps tenodesis 12/3/24          Manuals 12/11 12/16 12/18 12/23 12/27 12/31 1/3 1/6     prom kl kl kl kl kl JLW kl kl                                            Neuro Re-Ed                                                                                                        Ther Ex             Scap retraction  5\"x20 5\"x20  5\"x20        pendulums  x20 x20 x20 x20        Submax Isometric abd/ext/ir/er  5\"x10ea 5\"x10 5\"x10ea nv 5\"x10 ea Ir/er 10ea      Table slides   Flexion 5\"x10 5\"10  flex  5\"x20       Pulleys flex/scap       5\"x20 5\"x20ea     Supine cane flexion       5\"x20      Tb ir/er       Comfortabl e rom NV Green tb 2x10ea     Prone rows       x20 x2     Shoulder scaption        Mirror feedback 20     Rhythmic stabilitzation        1'x2                               Ther Activity                                       Gait Training                                       Modalities             CP prn                                 "

## 2025-01-07 ENCOUNTER — OFFICE VISIT (OUTPATIENT)
Dept: OBGYN CLINIC | Facility: CLINIC | Age: 35
End: 2025-01-07
Payer: COMMERCIAL

## 2025-01-07 VITALS — WEIGHT: 114 LBS | HEIGHT: 61 IN | BODY MASS INDEX: 21.52 KG/M2

## 2025-01-07 DIAGNOSIS — M79.644 THUMB PAIN, RIGHT: Primary | ICD-10-CM

## 2025-01-07 DIAGNOSIS — S60.429S: ICD-10-CM

## 2025-01-07 PROCEDURE — 99213 OFFICE O/P EST LOW 20 MIN: CPT | Performed by: ORTHOPAEDIC SURGERY

## 2025-01-07 NOTE — PROGRESS NOTES
Assessment/Plan:  1. Thumb pain, right        2. Blister (nonthermal) of unspecified finger, sequela            Discussed with patient that IP joint ROM will improved once residual swelling resolved.  Silipos sleeve was provided to patient today to wear for edema management and scar remodeling  Encouraged to do desensitization at home. Instruction given for desensitization exercises.  Provided ROM HEP or right thumb for patient to help with swelling.  Patient may continue activity as tolerated.  Patient will follow up in 4 weeks for reevaluation and ROM check of right thumb.       cc: Right thumb discomfort.    Subjective:   Isha Jacobo is a  34 y.o. female who presents for initial evaluation of right thumb pain.  Patient states she had right shoulder arthroscopy with labral repair with Dr. Concepcion on 12/3/2024.  Patient states 3 days later she noticed a blood blister within her right thumb along with top and bottom aspect of her thumb.  Patient states that she was having it monitored by Dr. Concepcion where he then referred her to a hand surgeon to have further evaluation done as she states she has limited range of motion of the thumb.  Patient states it is sensitive to the touch and states it is numb and tingly at times.  Patient has not had any recent hand therapy for this in the past.      Review of Systems   Constitutional:  Negative for chills, fever and unexpected weight change.   HENT:  Negative for hearing loss, nosebleeds and sore throat.    Eyes:  Negative for pain, redness and visual disturbance.   Respiratory:  Negative for cough, shortness of breath and wheezing.    Cardiovascular:  Negative for chest pain, palpitations and leg swelling.   Gastrointestinal:  Negative for abdominal pain, nausea and vomiting.   Endocrine: Negative for polydipsia and polyuria.   Genitourinary:  Negative for dysuria and hematuria.   Musculoskeletal:         See HPI   Skin:  Negative for rash and wound.   Neurological:  Negative  for dizziness, numbness and headaches.   Psychiatric/Behavioral:  Negative for decreased concentration and suicidal ideas. The patient is not nervous/anxious.          Past Medical History:   Diagnosis Date    Exercises 5 to 6 times per week     PONV (postoperative nausea and vomiting)     with C Sections//and dizziness    Wears glasses     and contacts       Past Surgical History:   Procedure Laterality Date     SECTION      x2    FL INJECTION RIGHT SHOULDER (ARTHROGRAM)  10/9/2024    RI MASTOPEXY Bilateral 2020    Procedure: BREAST MASTOPEXY;  Surgeon: Kashif Grande MD;  Location: [facility];  Service: Plastics    RI SURGICAL ARTHROSCOPY IVY W/CORACOACRM LIGM RLS Right 12/3/2024    Procedure: ARTHROSCOPIC SUBACROMIAL DECOMPRESSION;  Surgeon: North Concepcion DO;  Location: [facility];  Service: Orthopedics    RI SURGICAL ARTHROSCOPY SHOULDER CAPSULORRHAPHY Right 12/3/2024    Procedure: REPAIR LABRUM;  Surgeon: North Concepcion DO;  Location: [facility];  Service: Orthopedics    RI TENODESIS LONG TENDON BICEPS Right 12/3/2024    Procedure: TENODESIS BICEPS OPEN PROXIMAL;  Surgeon: North Concepcion DO;  Location: [facility];  Service: Orthopedics    WISDOM TOOTH EXTRACTION         History reviewed. No pertinent family history.    Social History     Occupational History    Not on file   Tobacco Use    Smoking status: Never    Smokeless tobacco: Never   Vaping Use    Vaping status: Never Used   Substance and Sexual Activity    Alcohol use: Yes     Comment: minimal    Drug use: Never    Sexual activity: Not on file         Current Outpatient Medications:     Multiple Vitamin (MULTI-VITAMIN PO), Take by mouth, Disp: , Rfl:     Allergies   Allergen Reactions    Bactrim [Sulfamethoxazole-Trimethoprim] Hives       Objective:    The patient was awake, alert, and oriented to person, place, and time.  No acute distress.  Normocephalic.  EOMI.  Mucous membranes moist.  Normal respiratory  effort.    Examination of the affected extremity was compared to the unaffected extremity.  Skin was intact, blister resolved and scar present.  Residual swelling present.  No deformity.  Hand and fingers were warm and well-perfused.  Capillary refill was brisk.  IP joint ROM slightly limited secondary to edema.  Mild sensitivity along ulnar border of thumb. Sensation grossly intact.          This document was created using speech voice recognition software.   Grammatical errors, random word insertions, pronoun errors, and incomplete sentences are an occasional consequence of this system due to software limitations, ambient noise, and hardware issues.   Any formal questions or concerns about content, text, or information contained within the body of this dictation should be directly addressed to the provider for clarification.     Scribe Attestation      I,:  Alfredo Copeland am acting as a scribe while in the presence of the attending physician.:       I,:  Jessica Flores MD personally performed the services described in this documentation    as scribed in my presence.:

## 2025-01-08 ENCOUNTER — OFFICE VISIT (OUTPATIENT)
Dept: PHYSICAL THERAPY | Facility: CLINIC | Age: 35
End: 2025-01-08
Payer: COMMERCIAL

## 2025-01-08 DIAGNOSIS — Z98.890 S/P ARTHROSCOPY OF RIGHT SHOULDER: Primary | ICD-10-CM

## 2025-01-08 PROCEDURE — 97110 THERAPEUTIC EXERCISES: CPT | Performed by: PHYSICAL THERAPIST

## 2025-01-08 PROCEDURE — 97140 MANUAL THERAPY 1/> REGIONS: CPT | Performed by: PHYSICAL THERAPIST

## 2025-01-08 NOTE — PROGRESS NOTES
"Daily Note     Today's date: 2025  Patient name: Isha Jacobo  : 1990  MRN: [id number]  Referring provider: Beatriz Joe PA-C  Dx:   Encounter Diagnosis     ICD-10-CM    1. S/P arthroscopy of right shoulder  Z98.890                      Subjective: pt reports that she had a consult with ortho/hand that provided her with a new bandage and she feels her rom is improving as a result. Reports slight soreness after progression LV but no sig pain.       Objective: See treatment diary below      Assessment: progressed therex as listed with no complaints. ROM continues to slowly improve.  No sig pain reported t/o therex. Requires cues initially for appropriate scap retraction during LPD but this improves with reps. Provided pt with updated hep, reports that she she has tb at home already.       Access Code: [email or web address]  URL: [email or web address]  Date: 2025  Prepared by: Rafael Lopez    Exercises  - Shoulder Internal Rotation with Resistance  - 1 x daily - 7 x weekly - 20 reps  - Shoulder External Rotation with Anchored Resistance  - 1 x daily - 7 x weekly - 20 reps  - Shoulder extension with resistance - Neutral  - 1 x daily - 7 x weekly - 20 reps - 5 seconds hold      Plan: Continue per plan of care.      Precautions: SLAP repair and biceps tenodesis 12/3/24          Manuals 12/11 12/16 12/18 12/23 12/27 12/31 1/3 1/6 1/8    prom kl kl kl kl kl JLW kl kl kl                                           Neuro Re-Ed                                                                                                        Ther Ex             Scap retraction  5\"x20 5\"x20  5\"x20        pendulums  x20 x20 x20 x20        Submax Isometric abd/ext/ir/er  5\"x10ea 5\"x10 5\"x10ea nv 5\"x10 ea Ir/er 10ea      Table slides   Flexion 5\"x10 5\"10  flex  5\"x20       Pulleys flex/scap       5\"x20 5\"x20ea 5\"x20    Supine cane flexion       5\"x20      Tb ir/er       Comfortabl e rom NV Green tb 2x10ea Green tb x20ea  "   Prone rows       x20 x2 x20    Shoulder scaption        Mirror feedback 20     Rhythmic stabilitzation        1'x2 1'x2    LPD         Green tb x20                 Ther Activity                                       Gait Training                                       Modalities             CP prn

## 2025-01-13 ENCOUNTER — OFFICE VISIT (OUTPATIENT)
Dept: PHYSICAL THERAPY | Facility: CLINIC | Age: 35
End: 2025-01-13
Payer: COMMERCIAL

## 2025-01-13 DIAGNOSIS — Z98.890 S/P ARTHROSCOPY OF RIGHT SHOULDER: Primary | ICD-10-CM

## 2025-01-13 PROCEDURE — 97140 MANUAL THERAPY 1/> REGIONS: CPT | Performed by: PHYSICAL THERAPIST

## 2025-01-13 PROCEDURE — 97110 THERAPEUTIC EXERCISES: CPT | Performed by: PHYSICAL THERAPIST

## 2025-01-13 NOTE — PROGRESS NOTES
"Daily Note     Today's date: 2025  Patient name: Isha Jacobo  : 1990  MRN: [id number]  Referring provider: Beatriz Joe PA-C  Dx:   No diagnosis found.                 Subjective: pt reports that her shoulder continues to improve       Objective: See treatment diary below      Assessment: progressed therex as listed with no complaints.  Continues to be limited with end range flexion/abd but has good tolerance to manual tx with improved rom noted post-tx.              Plan: Continue per plan of care.      Precautions: SLAP repair and biceps tenodesis 12/3/24          Manuals 12/11 12/16 12/18 12/23 12/27 12/31 1/3 1/6 1/8 1/13   prom kl kl kl kl kl JLW kl kl kl kl                                          Neuro Re-Ed                                                                                                        Ther Ex             Scap retraction  5\"x20 5\"x20  5\"x20        pendulums  x20 x20 x20 x20        Submax Isometric abd/ext/ir/er  5\"x10ea 5\"x10 5\"x10ea nv 5\"x10 ea Ir/er 10ea      Table slides   Flexion 5\"x10 5\"10  flex  5\"x20       Pulleys flex/scap       5\"x20 5\"x20ea 5\"x20 5\"x20   Supine cane flexion       5\"x20      Tb ir/er       Comfortabl e rom NV Green tb 2x10ea Green tb x20ea Green x20   Prone rows       x20 x2 x20 x30   Shoulder scaption        Mirror feedback 20     Rhythmic stabilitzation        1'x2 1'x2 1'x2   LPD         Green tb x20 Blue tb x20   LT wall slides          x20   Supine punches          5\"x20   Ther Activity                                       Gait Training                                       Modalities             CP prn                                      "

## 2025-01-15 ENCOUNTER — APPOINTMENT (OUTPATIENT)
Dept: PHYSICAL THERAPY | Facility: CLINIC | Age: 35
End: 2025-01-15
Payer: COMMERCIAL

## 2025-01-16 ENCOUNTER — OFFICE VISIT (OUTPATIENT)
Dept: PHYSICAL THERAPY | Facility: CLINIC | Age: 35
End: 2025-01-16
Payer: COMMERCIAL

## 2025-01-16 DIAGNOSIS — Z98.890 S/P ARTHROSCOPY OF RIGHT SHOULDER: Primary | ICD-10-CM

## 2025-01-16 PROCEDURE — 97110 THERAPEUTIC EXERCISES: CPT | Performed by: PHYSICAL THERAPIST

## 2025-01-16 PROCEDURE — 97140 MANUAL THERAPY 1/> REGIONS: CPT | Performed by: PHYSICAL THERAPIST

## 2025-01-16 NOTE — PROGRESS NOTES
"Daily Note     Today's date: 2025  Patient name: Isha Jacobo  : 1990  MRN: [id number]  Referring provider: Beatriz Joe PA-C  Dx:   Encounter Diagnosis     ICD-10-CM    1. S/P arthroscopy of right shoulder  Z98.890                      Subjective: Pt with no complaints since last visit. She continues to note weakness in the shoulder, some soreness as expected.       Objective: See treatment diary below      Assessment: Pt continues to tolerate treatment well, no changes to exercises as pt is adequately challenged and fatigued with performing them. Continuing to follow protocol, meeting all milestones. Pt will benefit from continued skilled PT.       Plan: Continue per plan of care.      Precautions: SLAP repair and biceps tenodesis 12/3/24          Manuals 1/16 12/16 12/18 12/23 12/27 12/31 1/3 1/6 1/8 1/13   prom DL kl kl kl kl JLW kl kl kl kl                                          Neuro Re-Ed                                                                                                        Ther Ex             Scap retraction  5\"x20 5\"x20  5\"x20        pendulums  x20 x20 x20 x20        Submax Isometric abd/ext/ir/er  5\"x10ea 5\"x10 5\"x10ea nv 5\"x10 ea Ir/er 10ea      Table slides   Flexion 5\"x10 5\"10  flex  5\"x20       Pulleys flex/scap 5\"x20      5\"x20 5\"x20ea 5\"x20 5\"x20   Supine cane flexion       5\"x20      Tb ir/er Green x20      Comfortabl e rom NV Green tb 2x10ea Green tb x20ea Green x20   Prone rows x30      x20 x2 x20 x30   Shoulder scaption        Mirror feedback 20     Rhythmic stabilitzation 1'x2       1'x2 1'x2 1'x2   LPD Blue x20        Green tb x20 Blue tb x20   LT wall slides x20         x20   Supine punches 5\"x20         5\"x20   Ther Activity                                       Gait Training                                       Modalities             CP prn                                        "

## 2025-01-20 ENCOUNTER — OFFICE VISIT (OUTPATIENT)
Dept: PHYSICAL THERAPY | Facility: CLINIC | Age: 35
End: 2025-01-20
Payer: COMMERCIAL

## 2025-01-20 DIAGNOSIS — Z98.890 S/P ARTHROSCOPY OF RIGHT SHOULDER: Primary | ICD-10-CM

## 2025-01-20 PROCEDURE — 97110 THERAPEUTIC EXERCISES: CPT

## 2025-01-20 PROCEDURE — 97140 MANUAL THERAPY 1/> REGIONS: CPT

## 2025-01-20 NOTE — PROGRESS NOTES
"Daily Note     Today's date: 2025  Patient name: Isha Jacobo  : 1990  MRN: [id number]  Referring provider: Beatriz Joe PA-C  Dx:   Encounter Diagnosis     ICD-10-CM    1. S/P arthroscopy of right shoulder  Z98.890           Start Time: 1702  Stop Time: 1748  Total time in clinic (min): 46 minutes      Subjective: Patient reports mild soreness in her right shoulder.      Objective: See treatment diary below.      Assessment: Fatigue noted with progression of TE program.  Right shoulder ROM improving.      Plan: Continue treatment as per PT plan of care.       Precautions: SLAP repair and biceps tenodesis 12/3/24      Manuals 1/16 1/20  12/23 12/27 12/31 1/3 1/6 1/8 1/13   prom DL JLW  kl kl JLW kl kl kl kl                                          Neuro Re-Ed                                                                                                        Ther Ex             Scap retraction     5\"x20        pendulums    x20 x20        Submax Isometric abd/ext/ir/er    5\"x10ea nv 5\"x10 ea Ir/er 10ea      Table slides    5\"10  flex  5\"x20       Pulleys flex/scap 5\"x20 5\"x20     5\"x20 5\"x20ea 5\"x20 5\"x20   Supine cane flexion       5\"x20      Tb ir/er Green x20 green  20 ea     Comfortabl e rom NV Green tb 2x10ea Green tb x20ea Green x20   Prone rows x30 3#  20     x20 x2 x20 x30   Shoulder scaption        Mirror feedback 20     Rhythmic stabilitzation 1'x2  1'   2      1'x2 1'x2 1'x2   LPD Blue x20  blue   20       Green tb x20 Blue tb x20   LT wall slides x20  5\"x20        x20   Supine punches 5\"x20  3#   5\"x20        5\"x20                             Ther Activity                                       Gait Training                                       Modalities             CP prn                                      "

## 2025-01-22 ENCOUNTER — OFFICE VISIT (OUTPATIENT)
Dept: OBGYN CLINIC | Facility: CLINIC | Age: 35
End: 2025-01-22

## 2025-01-22 ENCOUNTER — OFFICE VISIT (OUTPATIENT)
Dept: PHYSICAL THERAPY | Facility: CLINIC | Age: 35
End: 2025-01-22
Payer: COMMERCIAL

## 2025-01-22 VITALS — WEIGHT: 115 LBS | HEIGHT: 61 IN | BODY MASS INDEX: 21.71 KG/M2

## 2025-01-22 DIAGNOSIS — S60.429S: Primary | ICD-10-CM

## 2025-01-22 DIAGNOSIS — Z98.890 S/P ARTHROSCOPY OF RIGHT SHOULDER: Primary | ICD-10-CM

## 2025-01-22 DIAGNOSIS — S43.431D SUPERIOR GLENOID LABRUM LESION OF RIGHT SHOULDER, SUBSEQUENT ENCOUNTER: ICD-10-CM

## 2025-01-22 PROCEDURE — 97110 THERAPEUTIC EXERCISES: CPT | Performed by: PHYSICAL THERAPIST

## 2025-01-22 PROCEDURE — 99024 POSTOP FOLLOW-UP VISIT: CPT | Performed by: STUDENT IN AN ORGANIZED HEALTH CARE EDUCATION/TRAINING PROGRAM

## 2025-01-22 PROCEDURE — 97140 MANUAL THERAPY 1/> REGIONS: CPT | Performed by: PHYSICAL THERAPIST

## 2025-01-22 NOTE — PROGRESS NOTES
Shoulder Post Operative Visit     Assesment:     34 y.o. female 7 weeks status post right shoulder arthroscopy with labral repair, arthroscopic biceps tenodesis, and subacromial decompression performed on 12/30/2024    Plan:  The patient's diagnosis and treatment were discussed at length today. We discussed no treatment, non-operative treatment, and operative treatment.    Isha returns today for follow-up evaluation 7 weeks status post right shoulder arthroscopy with labral repair, arthroscopic biceps tenodesis, and subacromial decompression performed on 12/30/2024.  I discussed with her that her shoulder is continue to make improvements and would like her to continue outpatient physical therapy.  I did also review home exercises for her to do at today's visit.  She can continue ice and over-the-counter medication as needed for pain relief.  She is to follow-up in approximately 6 to 8 weeks for reevaluation.    In regards to her shoulder I discussed with her that the hypersensitivity along the volar aspect of her thumb will continue to improve.  We did discuss possibility of starting hand therapy but she wishes to just continue home exercises and use of her splint provided by Dr. Flores.  I discussed with her that she should continue to keep her follow-up appointment in approximately 2 weeks with Dr. Flores in regards to this, and that we highly appreciate Dr. Flores's input.    Post-Operative treatment:    Ice to shoulder 1-2 times daily, for 20 minutes at a time.  Continue outpatient physical therapy according to protocol.    Imaging:    All imaging from today was reviewed by myself and explained to the patient.     Sling:  never, as they have completed this portion of the post op period.    DVT Prophylaxis:  Ambulation    Follow up:   6 weeks    Patient was advised that if they have any fevers, chills, chest pain, shortness of breath, redness or drainage from the incision, please let our office know  "immediately.        Chief Complaint   Patient presents with    Right Shoulder - Post-op       History of Present Illness:    The patient is a 34 y.o. female who is being evaluated post operatively 7 weeks   status post right shoulder arthroscopy with labral repair, arthroscopic biceps tenodesis, and subacromial decompression performed on 12/30/2024.  She states in regards to her shoulder pain is improving and only reports some achiness from time to time that improves with a period of rest.  She states that she has been compliant with outpatient physical therapy beginning gentle strengthening of 3 pounds at this time.  She states that she has not had any issues or complications since discontinuing use of the sling.  In regards to her right thumb.  She mentions that she did see Dr. Flores who provided her with a toe splint that she does wear that provide some relief of her symptoms.  She states inside of her thumb still is very hypersensitive to touch does have some stiffness.  She denies any new injury or trauma to her shoulder.    I have reviewed the past medical, surgical, social and family history, medications and allergies as documented in the EMR.    Review of systems: ROS is negative other than that noted in the HPI.  Constitutional: Negative for fatigue and fever.       Physical Exam:    Height 5' 1\" (1.549 m), weight 52.2 kg (115 lb), not currently breastfeeding.    General/Constitutional: NAD, well developed, well nourished  HENT: Normocephalic, atraumatic  CV: Intact distal pulses, regular rate  Resp: No respiratory distress or labored breathing  Lymphatic: No lymphadenopathy palpated  Neuro: Alert and Oriented x 3, no focal deficits  Psych: Normal mood, normal affect, normal judgement, normal behavior  Skin: Warm, dry, no rashes, no erythema    Shoulder focused exam:        Visual inspection of the shoulder demonstrates normal contour without atrophy  Incisions are healed with no surrounding edema or " erythema.  Motor and sensory function are both intact in regards to her shoulder.    Compartments are soft and compressible capillary refill is brisk.  Shoulder range of motion demonstrates forward flexion to 160 degrees, abduction to 80 degrees, external rotation with arm at the side to 45 degrees, external rotation with arm at 90 degrees of abduction to 80 degrees, internal rotation to L1.  Strength not tested.  No special test performed.    Visual inspection of the thumb demonstrates resolved ecchymosis with improving discoloration.  Hypersensitivity to touch and improving blistering along the volar aspect of the thumb.        UE NV Exam: +2 Radial pulses bilaterally  Sensation intact to light touch C5-T1 bilaterally, Radial/median/ulnar nerve motor intact      Scribe Attestation      I,:  Zachary Alberts am acting as a scribe while in the presence of the attending physician.:       I,:  North Concepcion, DO personally performed the services described in this documentation    as scribed in my presence.:

## 2025-01-22 NOTE — PROGRESS NOTES
"Daily Note     Today's date: 2025  Patient name: Isha Jacobo  : 1990  MRN: [id number]  Referring provider: Beatriz Joe PA-C  Dx:   Encounter Diagnosis     ICD-10-CM    1. S/P arthroscopy of right shoulder  Z98.890                        Subjective: Pt reports that she had a f/u with her surgeon and that they are pleased with her progress and would her to continue PT at this time.       Objective: See treatment diary below.      Assessment: increased weight and added therex as listed with no complaints.  Rom is improving with limitations are still present.  Plan to initiated BFR nv as listed      Plan: Continue treatment as per PT plan of care.       Precautions: SLAP repair and biceps tenodesis 12/3/24 8 week 25 can initiate resisted biceps      Manuals 1/16 1/20 1/22 12/23 12/27 12/31 1/3 1/6 1/8 1/13   prom DL JLW kl kl kl JLW kl kl kl kl                                          Neuro Re-Ed                                                                                                        Ther Ex             Scap retraction     5\"x20        pendulums    x20 x20        Submax Isometric abd/ext/ir/er    5\"x10ea nv 5\"x10 ea Ir/er 10ea      Table slides    5\"10  flex  5\"x20       Pulleys flex/scap 5\"x20 5\"x20 5\"x20ea    5\"x20 5\"x20ea 5\"x20 5\"x20   Supine cane flexion       5\"x20      Tb ir/er Green x20 green  20 ea Blue x20  Bfr nv    Comfortabl e rom NV Green tb 2x10ea Green tb x20ea Green x20   Prone rows x30 3#  20 4#x30    x20 x2 x20 x30   Shoulder scaption        Mirror feedback 20     Rhythmic stabilitzation 1'x2  1'   2 1'x2     1'x2 1'x2 1'x2   LPD Blue x20  blue   20 Black x20      Green tb x20 Blue tb x20   LT wall slides x20  5\"x20        x20   Supine punches 5\"x20  3#   5\"x20        5\"x20   Wall ball rolling   Red med ball 2x15 cw/ccw          Triceps press   [place]  16.3 x30  BFR NV                       Ther Activity                                       Gait Training           "                             Modalities             CP prn

## 2025-01-27 ENCOUNTER — APPOINTMENT (OUTPATIENT)
Dept: PHYSICAL THERAPY | Facility: CLINIC | Age: 35
End: 2025-01-27
Payer: COMMERCIAL

## 2025-01-29 ENCOUNTER — OFFICE VISIT (OUTPATIENT)
Dept: PHYSICAL THERAPY | Facility: CLINIC | Age: 35
End: 2025-01-29
Payer: COMMERCIAL

## 2025-01-29 DIAGNOSIS — Z98.890 S/P ARTHROSCOPY OF RIGHT SHOULDER: Primary | ICD-10-CM

## 2025-01-29 PROCEDURE — 97110 THERAPEUTIC EXERCISES: CPT

## 2025-01-29 PROCEDURE — 97140 MANUAL THERAPY 1/> REGIONS: CPT

## 2025-01-29 NOTE — PROGRESS NOTES
"Daily Note     Today's date: 2025  Patient name: Isha Jacobo  : 1990  MRN: [id number]  Referring provider: Beatriz Joe PA-C  Dx:   Encounter Diagnosis     ICD-10-CM    1. S/P arthroscopy of right shoulder  Z98.890           Start Time: 1658  Stop Time: 1758  Total time in clinic (min): 60 minutes      Subjective: Patient reports her right shoulder is feeling good.      Objective: See treatment diary below.      Assessment: Progression of TE program is tolerated well.  Right shoulder IR and ER ROM is limited.      Plan: Continue treatment as per PT plan of care.       Precautions: SLAP repair and biceps tenodesis 12/3/24 8 week 25 can initiate resisted biceps      Manuals 1/16 1/20 1/22 1/29  12/31 1/3 1/6 1/8 1/13   prom DL JLW kl JLW  JLW kl kl kl kl                                          Neuro Re-Ed                                                                                                        Ther Ex             Scap retraction             pendulums             Submax Isometric abd/ext/ir/er      5\"x10 ea Ir/er 10ea      Table slides      flex  5\"x20       Pulleys flex/scap 5\"x20 5\"x20 5\"x20ea 5\"x20 ea   5\"x20 5\"x20ea 5\"x20 5\"x20   Supine cane flexion       5\"x20      Tb ir/er Green x20 green  20 ea Blue x20  Bfr nv BFR  red  1x30,  3x15     Comfortabl e rom NV Green tb 2x10ea Green tb x20ea Green x20   Prone rows x30 3#  20 4#x30 4#  30   x20 x2 x20 x30   Shoulder scaption        Mirror feedback 20     Rhythmic stabilitzation 1'x2  1'   2 1'x2     1'x2 1'x2 1'x2   LPD Blue x20  blue   20 Black x20  black   30     Green tb x20 Blue tb x20   rows     black    30          LT wall slides x20  5\"x20        x20   Supine punches 5\"x20  3#   5\"x20        5\"x20   Wall ball rolling   Red med ball 2x15 cw/ccw red med ball 2x30 ea  cw/ccw         Triceps press   Kesier  16.3 x30  BFR NV  michelle  18.9  30                                   Ther Activity                                       Gait " Training                                       Modalities             CP prn

## 2025-02-03 ENCOUNTER — OFFICE VISIT (OUTPATIENT)
Dept: PHYSICAL THERAPY | Facility: CLINIC | Age: 35
End: 2025-02-03
Payer: COMMERCIAL

## 2025-02-03 DIAGNOSIS — Z98.890 S/P ARTHROSCOPY OF RIGHT SHOULDER: Primary | ICD-10-CM

## 2025-02-03 PROCEDURE — 97110 THERAPEUTIC EXERCISES: CPT

## 2025-02-03 PROCEDURE — 97140 MANUAL THERAPY 1/> REGIONS: CPT

## 2025-02-03 NOTE — PROGRESS NOTES
"Daily Note     Today's date: 2/3/2025  Patient name: Isha Jacobo  : 1990  MRN: [id number]  Referring provider: Beatriz Joe PA-C  Dx:   Encounter Diagnosis     ICD-10-CM    1. S/P arthroscopy of right shoulder  Z98.890           Start Time: 1657  Stop Time: 1805  Total time in clinic (min): 68 minutes      Subjective: No significant changes to report.      Objective: See treatment diary below.      Assessment: Fatigue noted when performing additional bicep curls.  Sleeper stretch added in attempt to improve right shoulder IR ROM.      Plan: Continue treatment as per PT plan of care.       Precautions: SLAP repair and biceps tenodesis 12/3/24 8 week 25 can initiate resisted biceps      Manuals 1/16 1/20 1/22 1/29 2/3  1/3 1/6 1/8 1/13   prom DL JLW kl JLW JLW  kl kl kl kl                                          Neuro Re-Ed                                                                                                        Ther Ex             Scap retraction             Submax Isometric abd/ext/ir/er       Ir/er 10ea      Table slides             Pulleys flex/scap 5\"x20 5\"x20 5\"x20ea 5\"x20 ea 5\"x20 ea  5\"x20 5\"x20ea 5\"x20 5\"x20   Supine cane flexion       5\"x20      Tb ir/er Green x20 green  20 ea Blue x20  Bfr nv BFR  red  1x30,  3x15   BFR  red  1x30,  3x15  Comfortabl e rom NV Green tb 2x10ea Green tb x20ea Green x20   Prone rows x30 3#  20 4#x30 4#  30 4#  30  x20 x2 x20 x30   Shoulder scaption        Mirror feedback 20     Rhythmic stabilitzation 1'x2  1'   2 1'x2     1'x2 1'x2 1'x2   LPD Blue x20  blue   20 Black x20  black   30 black    30    Green tb x20 Blue tb x20   rows     black    30  black    30        LT wall slides x20  5\"x20        x20   Supine punches 5\"x20  3#   5\"x20        5\"x20   Wall ball rolling   Red med ball 2x15 cw/ccw red med ball 2x30 ea  cw/ccw red med ball 2x30 ea  cw/ccw        Triceps press   Kesier  16.3 x30  BFR NV  michelle  18.9  30 michelle  19.0  30        Bicep " "salvatore      4#   2x10        sleeper stretch      30\"x3                                               Ther Activity                                       Gait Training                                       Modalities             CP prn                                     "

## 2025-02-04 ENCOUNTER — OFFICE VISIT (OUTPATIENT)
Dept: OBGYN CLINIC | Facility: CLINIC | Age: 35
End: 2025-02-04
Payer: COMMERCIAL

## 2025-02-04 VITALS — BODY MASS INDEX: 21.34 KG/M2 | WEIGHT: 113 LBS | HEIGHT: 61 IN

## 2025-02-04 DIAGNOSIS — M79.644 THUMB PAIN, RIGHT: Primary | ICD-10-CM

## 2025-02-04 PROCEDURE — 99213 OFFICE O/P EST LOW 20 MIN: CPT | Performed by: ORTHOPAEDIC SURGERY

## 2025-02-04 NOTE — PROGRESS NOTES
ORTHOPAEDIC HAND, WRIST, AND ELBOW OFFICE  VISIT     Name: Isha Jacobo      : 1990      MRN: [id number]  Encounter Provider: Jessica Flores MD  Encounter Date: 2025   Encounter department: [facility]  :  Assessment & Plan  Thumb pain, right  Discussed with patient that IP joint ROM will continue to improve.  Silipos sleeve was provided to patient today again to wear for edema management and scar remodeling  At last visit patient encouraged to do desensitization at home. Instruction given for desensitization exercises. Patient agreeable today to formal therapy as she would like these symptoms to resolve as quick as possible  Patient may continue activity as tolerated.  Patient will follow up in 6 weeks for reevaluation and ROM check of right thumb.   Orders:    Ambulatory Referral to Occupational Therapy; Future              History of Present Illness   HPI  Chief Complaint   Patient presents with    Right Thumb - Follow-up, Pain       Isha Jacobo is a 34 y.o. female who presents for follow-up for her right thumb. She had right shoulder arthroscopy with labral repair with Dr. Concepcion on 12/3/2024. Three days later she noticed a blood blister within her right thumb along with top and bottom aspect of her thumb. Patient states that she was having it monitored by Dr. Concepcion where he then referred her to a hand surgeon to have further evaluation done as she states she has limited range of motion of the thumb. Patient states it is sensitive to the touch and states it is numb and tingly at times.     She has been wearing the silipos sleeve and noted swelling has decreased, but she has continued n/t along the ulnar border of the thumb.      REVIEW OF SYSTEMS:  General: no fever, no chills  HEENT:  No loss of hearing or eyesight problems  Eyes:  No red eyes  Respiratory:  No coughing, shortness of breath or wheezing  Cardiovascular:  No chest pain, no  "palpitations  GI:  Abdomen soft nontender, denies nausea  Endocrine:  No muscle weakness, no frequent urination, no excessive thirst  Urinary:  No dysuria, no incontinence  Musculoskeletal: see HPI and PE  SKIN:  No skin rash, no dry skin  Neurological:  No headaches, no confusion  Psychiatric:  No suicide thoughts, no anxiety, no depression  Review of all other systems is negative    Objective   Ht 5' 1\" (1.549 m)   Wt 51.3 kg (113 lb)   BMI 21.35 kg/m²        Musculoskeletal exam:  RIGHT SIDE:  thumb      Examination of the affected extremity was compared to the unaffected extremity.  Skin was intact.  No swelling or ecchymosis.  Swelling resolved compared to prior exam.  No deformity.  Hand and fingers were warm and well-perfused.  Capillary refill was brisk.  Full active range of motion of the elbows, forearms, wrists, and fingers.      Thumb IP joint postured in slightly flexed position.  Able to extend at IP joint, but scar sensitivity occurs with IP joint extension.       STUDIES REVIEWED:  No Studies to review      PROCEDURES PERFORMED:  Procedures  No Procedures performed today       "

## 2025-02-05 ENCOUNTER — OFFICE VISIT (OUTPATIENT)
Dept: PHYSICAL THERAPY | Facility: CLINIC | Age: 35
End: 2025-02-05
Payer: COMMERCIAL

## 2025-02-05 DIAGNOSIS — Z98.890 S/P ARTHROSCOPY OF RIGHT SHOULDER: Primary | ICD-10-CM

## 2025-02-05 PROCEDURE — 97140 MANUAL THERAPY 1/> REGIONS: CPT

## 2025-02-05 PROCEDURE — 97110 THERAPEUTIC EXERCISES: CPT

## 2025-02-05 NOTE — PROGRESS NOTES
"Daily Note     Today's date: 2025  Patient name: Isha Jacobo  : 1990  MRN: [id number]  Referring provider: Beatriz Joe PA-C  Dx:   Encounter Diagnosis     ICD-10-CM    1. S/P arthroscopy of right shoulder  Z98.890           Start Time: 1531  Stop Time: 1630  Total time in clinic (min): 59 minutes      Subjective: No significant changes to report.      Objective: See treatment diary below.      Assessment: Progression of TE program is tolerated well.  Right shoulder ROM and strength improving.      Plan: Continue treatment as per PT plan of care.       Precautions: SLAP repair and biceps tenodesis 12/3/24 8 week 25 can initiate resisted biceps      Manuals 1/16 1/20 1/22 1/29 2/3 2/5  1/6 1/8 1/13   prom DL JLW kl JLW JLW JLW  kl kl kl                                          Neuro Re-Ed                                                                                                        Ther Ex             Scap retraction             Submax Isometric abd/ext/ir/er             Table slides             Pulleys flex/scap 5\"x20 5\"x20 5\"x20ea 5\"x20 ea 5\"x20 ea 5\"x20 ea  5\"x20ea 5\"x20 5\"x20   Supine cane flexion             Tb ir/er Green x20 green  20 ea Blue x20  Bfr nv BFR  red  1x30,  3x15   BFR  red  1x30,  3x15 BFR  green  1x30,  3x15  Green tb 2x10ea Green tb x20ea Green x20   Prone rows x30 3#  20 4#x30 4#  30 4#  30 5#  30  x2 x20 x30   Prone T       2x10       Shoulder scaption        Mirror feedback 20     Rhythmic stabilitzation 1'x2  1'   2 1'x2     1'x2 1'x2 1'x2   LPD Blue x20  blue   20 Black x20  black   30 black    30 black    30   Green tb x20 Blue tb x20   rows     black    30  black    30 black    30       LT wall slides x20  5\"x20        x20   Supine punches 5\"x20  3#   5\"x20        5\"x20   Wall ball rolling   Red med ball 2x15 cw/ccw red med ball 2x30 ea  cw/ccw red med ball 2x30 ea  cw/ccw red med ball 2x30 ea  cw/ccw       Triceps press   Kesier  16.3 x30  BFR NV  " "michelle  18.9  30 michelle  19.0  30 michelle  19.8  30       Bicep curls      4#   2x10  5#   2x10       sleeper stretch      30\"x3        tall plank with alt shoulder taps       10 ea       body blade ir/er, flex, abd       30\" ea                                              Ther Activity                                       Gait Training                                       Modalities             CP prn                                   "

## 2025-02-17 ENCOUNTER — OFFICE VISIT (OUTPATIENT)
Dept: PHYSICAL THERAPY | Facility: CLINIC | Age: 35
End: 2025-02-17
Payer: COMMERCIAL

## 2025-02-17 DIAGNOSIS — Z98.890 S/P ARTHROSCOPY OF RIGHT SHOULDER: Primary | ICD-10-CM

## 2025-02-17 PROCEDURE — 97110 THERAPEUTIC EXERCISES: CPT

## 2025-02-17 PROCEDURE — 97140 MANUAL THERAPY 1/> REGIONS: CPT

## 2025-02-17 NOTE — PROGRESS NOTES
"Daily Note     Today's date: 2025  Patient name: Isha Jacobo  : 1990  MRN: [id number]  Referring provider: Beatriz Joe PA-C  Dx:   Encounter Diagnosis     ICD-10-CM    1. S/P arthroscopy of right shoulder  Z98.890           Start Time: 1531  Stop Time: 1615  Total time in clinic (min): 44 minutes      Subjective: Patient reports her right shoulder still feels tight.  Patient reports she's been exercising at home.      Objective: See treatment diary below.      Assessment: TE program is tolerated well.  Passive ER ROM improves with the manual stretching.      Plan: Continue treatment as per PT plan of care.  Primary PT will re-evaluate patient next session.       Precautions: SLAP repair and biceps tenodesis 12/3/24 8 week 25 can initiate resisted biceps      Manuals 1/16 1/20 1/22 1/29 2/3 2/5 2/17  1/8 1/13   prom DL JLW kl JLW JLW JLW JLW  kl kl                                          Neuro Re-Ed                                                                                                        Ther Ex             Scap retraction             Submax Isometric abd/ext/ir/er             Table slides             Pulleys flex/scap 5\"x20 5\"x20 5\"x20ea 5\"x20 ea 5\"x20 ea 5\"x20 ea   5\"x20 5\"x20   Supine cane flexion             Tb ir/er Green x20 green  20 ea Blue x20  Bfr nv BFR  red  1x30,  3x15   BFR  red  1x30,  3x15 BFR  green  1x30,  3x15 blue  30 ea  Green tb x20ea Green x20   Prone rows x30 3#  20 4#x30 4#  30 4#  30 5#  30 8#  20  x20 x30   Prone T       2x10  2x10      Shoulder scaption             Rhythmic stabilitzation 1'x2  1'   2 1'x2      1'x2 1'x2   LPD Blue x20  blue   20 Black x20  black   30 black    30 black    30  black   30  Green tb x20 Blue tb x20   rows     black    30  black    30 black    30 michelle  16.2  30      LT wall slides x20  5\"x20        x20   Supine punches 5\"x20  3#   5\"x20        5\"x20   Wall ball rolling   Red med ball 2x15 cw/ccw red med ball 2x30 " "ea  cw/ccw red med ball 2x30 ea  cw/ccw red med ball 2x30 ea  cw/ccw red med ball 2x30 ea  cw/ccw      Triceps press   Kesier  16.3 x30  BFR NV  michelle  18.9  30 michelle  19.0  30 michelle  19.8  30 michelle  20.0  30      Bicep curls      4#   2x10  5#   2x10  12#   HEP      sleeper stretch      30\"x3        tall plank with alt shoulder taps       10 ea  10 ea      body blade ir/er, flex, abd       30\" ea  30\" ea      tall plank walk overs        airex   10                                Ther Activity                                       Gait Training                                       Modalities             CP prn                                     "

## 2025-02-18 ENCOUNTER — EVALUATION (OUTPATIENT)
Dept: OCCUPATIONAL THERAPY | Facility: CLINIC | Age: 35
End: 2025-02-18
Payer: COMMERCIAL

## 2025-02-18 DIAGNOSIS — M79.644 THUMB PAIN, RIGHT: ICD-10-CM

## 2025-02-18 PROCEDURE — 97530 THERAPEUTIC ACTIVITIES: CPT | Performed by: OCCUPATIONAL THERAPIST

## 2025-02-18 PROCEDURE — 97165 OT EVAL LOW COMPLEX 30 MIN: CPT | Performed by: OCCUPATIONAL THERAPIST

## 2025-02-18 NOTE — PROGRESS NOTES
OT Evaluation     Today's date: 2025  Patient name: Isha Jacobo  : 1990  MRN: [id number]  Referring provider: Jessica Flores,*  Dx:   Encounter Diagnosis     ICD-10-CM    1. Thumb pain, right  M79.644 Ambulatory Referral to Occupational Therapy                     Assessment  Impairments: activity intolerance, impaired physical strength and pain with function  Functional limitations: Pain with writing , bottles , hair ,hair , shoelaces  Symptom irritability: low    Assessment details: Isha presents with R thumb pain that started after shoulder surgery . She has pain and sensitivty on the ulnar aspect of her R thumb. She has mild edema. She has tinel's near the nail bed.  She has weak R pinch . She has  difficulty writing., doing hair , bottles/jars. She works a speech therapist. She is  and has 2 children.     She will likely do HEP only.  Understanding of Dx/Px/POC: good     Prognosis: good    Goals  STG - 1 wk  1- I with HEP  2-  worst pain  3/10    LTG- 4 wks  1-  No pain   2- R pinches 10#  3- I ADL - write , do hair     Plan    Planned therapy interventions: manual therapy, massage, ADL retraining, stretching, strengthening, home exercise program, fine motor coordination training and nerve gliding    Frequency: as needed.  Plan of Care beginning date: 2025        Subjective Evaluation    History of Present Illness  Date of onset: 12/3/2024  Mechanism of injury: Isha had pain and blistering on R thumb following shoulder surgery . This may have been her sling   Quality of life: excellent    Patient Goals  Patient goals for therapy: decreased pain, decreased edema, increased strength, independence with ADLs/IADLs, return to sport/leisure activities and increased motion  Patient goal: regain use of R thumb  Pain  Current pain ratin  At best pain ratin  At worst pain ratin  Location: R thumb  Quality: needle-like and discomfort  Relieving factors: rest and  heat  Exacerbated by: cold temps, pressure to thumb.  Progression: improved    Social Support  Steps to enter house: yes  Stairs in house: yes   Lives in: multiple-level home  Lives with: spouse and young children    Employment status: working  Hand dominance: right    Treatments  No previous or current treatments        Objective     Palpation     Additional Palpation Details  Tender volar IP     Neurological Testing     Additional Neurological Details  C/o paresthesias R thumb    2 point 5mm thumb  Sensitive to cold and pressure     Active Range of Motion     Right Wrist   Normal active range of motion    Right Thumb   Flexion     MP: 80    DIP: 60  Extension     MP: 0    DIP: 0  Kapandji score: 10 degrees    Passive Range of Motion     Right Thumb   Flexion     MP: 75    DIP: 90    Strength/Myotome Testing     Left Wrist/Hand      (2nd hand position)     Trial 1: 36    Thumb Strength  Key/Lateral Pinch     Trial 1: 12  Tip/Two-Point Pinch     Trial 1: 10  Palmar/Three-Point Pinch     Trial 1: 11    Right Wrist/Hand      (2nd hand position)     Trial 1: 34    Thumb Strength   Key/Lateral Pinch     Trial 1: 8  Tip/Two-Point Pinch     Trial 1: 4  Palmar/Three-Point Pinch     Trial 1: 6    Additional Strength Details  FPL 5/5    Tests     Additional Tests Details  Tinels ulnar thumb at IP    Swelling     Left Wrist/Hand   Thumb     Distal: 5.8 cm    Right Wrist/Hand   Thumb     Distal: 6 cm            Daily Treatment Diary     Precautions: universal   CO-MORBIDITIES:na  HEP ACCESS CODE:   FOTO Completed On: 2/18/25    POC Expires Reeval for Medicare to be completed  Auth Expiration Date PT/OT/STVisit Limit   4/18/25 By visit na 2/4/26 1    Completed on visit                   Pending  DATE 2/18         Visit # 1         Remaining         MANUAL THERAPY         Retrograde  4m        MOB IP  4m                                   Behavior mod discussed 4m        THERAPEUTIC EXERCISE HEP- desens /ROM / putty  /Nerve/glides  4x day        A/PROM R thumb   4m                                                                                                    Ay                                                 NEUROMUSCULAR REEDUCATION                                                                                                                                                       THERAPEUTIC ACTIVITY                                                                                                    MODALITIES           6m

## 2025-02-19 ENCOUNTER — EVALUATION (OUTPATIENT)
Dept: PHYSICAL THERAPY | Facility: CLINIC | Age: 35
End: 2025-02-19
Payer: COMMERCIAL

## 2025-02-19 DIAGNOSIS — Z98.890 S/P ARTHROSCOPY OF RIGHT SHOULDER: Primary | ICD-10-CM

## 2025-02-19 PROCEDURE — 97140 MANUAL THERAPY 1/> REGIONS: CPT | Performed by: PHYSICAL THERAPIST

## 2025-02-19 PROCEDURE — 97110 THERAPEUTIC EXERCISES: CPT | Performed by: PHYSICAL THERAPIST

## 2025-02-19 NOTE — PROGRESS NOTES
PT Re-Evaluation     Today's date: 2025  Patient name: Isha Jacobo  : 1990  MRN: [id number]  Referring provider: Beatriz Joe PA-C  Dx:   Encounter Diagnosis     ICD-10-CM    1. S/P arthroscopy of right shoulder  Z98.890                        Assessment    Assessment details: Pt is being treated with outpatient PT. She has  made good gains in rom, strength, pain reduction and functional mobility but continues to have deficits. She will benefit from continued skilled PT with a focus on manual tx to restore end range mobility and progress of her hep.  Thank you for this referral.            Goals  Short Term Goals:   Independent performance of initial hep-met  Decrease pain 2 points on VAS-met      Long Term Goals:  Independent performance of comprehensive hep-ongoing  Work performance is returned to max level of function-met  Performance of IADL's is returned to max level of function-ongoing  Performance in recreational activities is improved to max level of function-not met  Able to reach overhead with min pain-ongoing  Full return to coaching youth sports.      Plan    Planned therapy interventions: manual therapy, strengthening, stretching, therapeutic activities, therapeutic exercise, home exercise program, IADL retraining and therapeutic training    Frequency: 1x week  Duration in weeks: 4        Subjective Evaluation    History of Present Illness  Mechanism of injury: Pt underwent R shoulder labral repair/biceps tenodesis on 12/3/24. Reports compliance with her current hep and feels she is improving but continues to have deficits. Reports that she continues to feel limited when reaching directly overhead and behind he back.         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    Patient Goals  Patient goals for therapy: decreased pain, increased motion, increased strength, independence with ADLs/IADLs, return to  "sport/leisure activities and return to work    Pain  Current pain ratin  At best pain ratin  At worst pain rating: 3              Objective    AROM:    Flexion: 160   Abduction: 155  Slight shoulder shrug noted with end range flexion and abduction       PROM:   Flexion: 165   Abduction:160   ER:  71   IR:35    Strength:    Flexion: 4 /5   Abduction:  4 /5   ER:    45   IR:     4+/5               Precautions: SLAP repair and biceps tenodesis 12/3/24        Manuals 1/16 1/20 1/22 1/29 2/3 2/5 2/17 2/19 1/8 1/13   prom DL JLW kl JLW JLW JLW JLW kl kl kl   Jnt mobs        kl                               Neuro Re-Ed                                                                                                        Ther Ex             Scap retraction             Submax Isometric abd/ext/ir/er             Table slides             Pulleys flex/scap 5\"x20 5\"x20 5\"x20ea 5\"x20 ea 5\"x20 ea 5\"x20 ea   5\"x20 5\"x20   Supine cane flexion             Tb ir/er Green x20 green  20 ea Blue x20  Bfr nv BFR  red  1x30,  3x15   BFR  red  1x30,  3x15 BFR  green  1x30,  3x15 blue  30 ea  Green tb x20ea Green x20   Prone rows x30 3#  20 4#x30 4#  30 4#  30 5#  30 8#  20  x20 x30   Prone T       2x10  2x10      Shoulder scaption             Rhythmic stabilitzation 1'x2  1'   2 1'x2      1'x2 1'x2   LPD Blue x20  blue   20 Black x20  black   30 black    30 black    30  black   30  Green tb x20 Blue tb x20   rows     black    30  black    30 black    30 michelle  16.2  30      LT wall slides x20  5\"x20        x20   Supine punches 5\"x20  3#   5\"x20        5\"x20   Wall ball rolling   Red med ball 2x15 cw/ccw red med ball 2x30 ea  cw/ccw red med ball 2x30 ea  cw/ccw red med ball 2x30 ea  cw/ccw red med ball 2x30 ea  cw/ccw      Triceps press   Kesier  16.3 x30  BFR NV  michelle  18.9  30 michelle  19.0  30 michelle  19.8  30 michelle  20.0  30      Bicep curls      4#   2x10  5#   2x10  12#   HEP      sleeper stretch      30\"x3   15\"x15   " "  tall plank with alt shoulder taps       10 ea  10 ea      body blade ir/er, flex, abd       30\" ea  30\" ea      tall plank walk overs        airex   10      Shoulder ir/er 90/90 stretch        5\"x20ea                  Ther Activity                                       Gait Training                                       Modalities             CP prn                                 "

## 2025-02-26 ENCOUNTER — EVALUATION (OUTPATIENT)
Dept: PHYSICAL THERAPY | Facility: CLINIC | Age: 35
End: 2025-02-26
Payer: COMMERCIAL

## 2025-02-26 DIAGNOSIS — Z98.890 S/P ARTHROSCOPY OF RIGHT SHOULDER: Primary | ICD-10-CM

## 2025-02-26 PROCEDURE — 97110 THERAPEUTIC EXERCISES: CPT | Performed by: PHYSICAL THERAPIST

## 2025-02-26 PROCEDURE — 97140 MANUAL THERAPY 1/> REGIONS: CPT | Performed by: PHYSICAL THERAPIST

## 2025-02-26 NOTE — PROGRESS NOTES
"Daily Note     Today's date: 2025  Patient name: Isha Jacobo  : 1990  MRN: [id number]  Referring provider: Beatriz Joe PA-C  Dx:   Encounter Diagnosis     ICD-10-CM    1. S/P arthroscopy of right shoulder  Z98.890                      Subjective: pt reports compliance with her current hep and notices slow improvements but continues to be limited in  ER      Objective: See treatment diary below      Assessment:Good tolerance to aggressive manual tx with improved rom noted post-tx. Progressed therex as listed with no complaints. Pt is comfortable to overhead ball toss today.  Instructed pt to add cross body stretch against wall to her hep and that she can initiate light over toss if no pain is generated.        Plan: Continue per plan of care.      Precautions: SLAP repair and biceps tenodesis 12/3/24        Manuals 1/16 1/20 1/22 1/29 2/3 2/5 2/17 2/19 2/26 1/13   prom DL JLW kl JLW JLW JLW JLW kl kl kl   Jnt mobs        kl kl                              Neuro Re-Ed                                                                                                        Ther Ex             Scap retraction             Submax Isometric abd/ext/ir/er             Table slides             Pulleys flex/scap 5\"x20 5\"x20 5\"x20ea 5\"x20 ea 5\"x20 ea 5\"x20 ea   5\"x20 5\"x20   Supine cane flexion             Tb ir/er Green x20 green  20 ea Blue x20  Bfr nv BFR  red  1x30,  3x15   BFR  red  1x30,  3x15 BFR  green  1x30,  3x15 blue  30 ea   Green x20   Prone rows x30 3#  20 4#x30 4#  30 4#  30 5#  30 8#  20   x30   Prone T       2x10  2x10      Shoulder scaption             Rhythmic stabilitzation 1'x2  1'   2 1'x2       1'x2   LPD Blue x20  blue   20 Black x20  black   30 black    30 black    30  black   30   Blue tb x20   rows     black    30  black    30 black    30 michelle  16.2  30      LT wall slides x20  5\"x20        x20   Supine punches 5\"x20  3#   5\"x20        5\"x20   Wall ball rolling   Red med ball 2x15 " "cw/ccw red med ball 2x30 ea  cw/ccw red med ball 2x30 ea  cw/ccw red med ball 2x30 ea  cw/ccw red med ball 2x30 ea  cw/ccw      Triceps press   Kesier  16.3 x30  BFR NV  michelle  18.9  30 michelle  19.0  30 michelle  19.8  30 michelle  20.0  30      Bicep curls      4#   2x10  5#   2x10  12#   HEP      sleeper stretch      30\"x3   15\"x15     tall plank with alt shoulder taps       10 ea  10 ea      body blade ir/er, flex, abd       30\" ea  30\" ea      tall plank walk overs        airex   10      Shoulder ir/er 90/90 stretch        5\"x20ea 5\"x20    Over head toss         Light Y ball 2x20    X-body stretch against wall         30\"x4    Prone hor abd with ER         x20    Ther Activity                                       Gait Training                                       Modalities             CP prn                                      "

## 2025-03-05 ENCOUNTER — OFFICE VISIT (OUTPATIENT)
Dept: PHYSICAL THERAPY | Facility: CLINIC | Age: 35
End: 2025-03-05
Payer: COMMERCIAL

## 2025-03-05 DIAGNOSIS — Z98.890 S/P ARTHROSCOPY OF RIGHT SHOULDER: Primary | ICD-10-CM

## 2025-03-05 PROCEDURE — 97140 MANUAL THERAPY 1/> REGIONS: CPT | Performed by: PHYSICAL THERAPIST

## 2025-03-05 NOTE — PROGRESS NOTES
"Daily Note     Today's date: 3/5/2025  Patient name: Isha Jacobo  : 1990  MRN: [id number]  Referring provider: Beatriz Joe PA-C  Dx:   Encounter Diagnosis     ICD-10-CM    1. S/P arthroscopy of right shoulder  Z98.890                      Subjective: pt reports continues improvements and that she has returned to most of her pre-injury workouts. Expresses concern about not being stretched if Dc'd. Discussed having a trial hold of PT for 2 weeks and if she feels her rom is declining she can f/u but if no f/u is requested she will be Dc'd at that tme    Objective: See treatment diary below      Assessment: continues to demonstrate good improvments in rom with manual tx.          Plan:hold PT for 2 weeks if no f/u requested DC at that time.      Precautions: SLAP repair and biceps tenodesis 12/3/24        Manuals 1/16 1/20 1/22 1/29 2/3 2/5 2/17 2/19 2/26 3/5   prom DL JLW kl JLW JLW JLW JLW kl kl kl   Jnt mobs        kl kl kl                             Neuro Re-Ed                                                                                                        Ther Ex             Scap retraction             Submax Isometric abd/ext/ir/er             Table slides             Pulleys flex/scap 5\"x20 5\"x20 5\"x20ea 5\"x20 ea 5\"x20 ea 5\"x20 ea   5\"x20    Supine cane flexion             Tb ir/er Green x20 green  20 ea Blue x20  Bfr nv BFR  red  1x30,  3x15   BFR  red  1x30,  3x15 BFR  green  1x30,  3x15 blue  30 ea      Prone rows x30 3#  20 4#x30 4#  30 4#  30 5#  30 8#  20      Prone T       2x10  2x10      Shoulder scaption             Rhythmic stabilitzation 1'x2  1'   2 1'x2          LPD Blue x20  blue   20 Black x20  black   30 black    30 black    30  black   30      rows     black    30  black    30 black    30 michelle  16.2  30      LT wall slides x20  5\"x20           Supine punches 5\"x20  3#   5\"x20           Wall ball rolling   Red med ball 2x15 cw/ccw red med ball 2x30 ea  cw/ccw red med ball 2x30 " "ea  cw/ccw red med ball 2x30 ea  cw/ccw red med ball 2x30 ea  cw/ccw      Triceps press   Kesier  16.3 x30  BFR NV  michelle  18.9  30 michelle  19.0  30 michelle  19.8  30 michelle  20.0  30      Bicep curls      4#   2x10  5#   2x10  12#   HEP      sleeper stretch      30\"x3   15\"x15     tall plank with alt shoulder taps       10 ea  10 ea      body blade ir/er, flex, abd       30\" ea  30\" ea      tall plank walk overs        airex   10      Shoulder ir/er 90/90 stretch        5\"x20ea 5\"x20    Over head toss         Light Y ball 2x20    X-body stretch against wall         30\"x4    Prone hor abd with ER         x20    Ther Activity                                       Gait Training                                       Modalities             CP prn                                      "

## 2025-03-13 ENCOUNTER — OFFICE VISIT (OUTPATIENT)
Dept: OBGYN CLINIC | Facility: CLINIC | Age: 35
End: 2025-03-13
Payer: COMMERCIAL

## 2025-03-13 VITALS — HEIGHT: 61 IN | WEIGHT: 113 LBS | BODY MASS INDEX: 21.34 KG/M2

## 2025-03-13 DIAGNOSIS — M75.41 SHOULDER IMPINGEMENT SYNDROME, RIGHT: ICD-10-CM

## 2025-03-13 DIAGNOSIS — S43.431D SUPERIOR GLENOID LABRUM LESION OF RIGHT SHOULDER, SUBSEQUENT ENCOUNTER: Primary | ICD-10-CM

## 2025-03-13 DIAGNOSIS — M79.644 THUMB PAIN, RIGHT: ICD-10-CM

## 2025-03-13 PROCEDURE — 99213 OFFICE O/P EST LOW 20 MIN: CPT | Performed by: STUDENT IN AN ORGANIZED HEALTH CARE EDUCATION/TRAINING PROGRAM

## 2025-03-13 NOTE — PROGRESS NOTES
Ortho Sports Medicine Shoulder Follow Up Visit     Assesment:   35 y.o. female 3 and a half status post right shoulder arthroscopy with labral repair, arthroscopic biceps tenodesis, and subacromial decompression performed on 12/30/2024     Plan:  The patient's diagnosis and treatment were discussed at length today. We discussed no treatment, non-operative treatment, and operative treatment.    Ihsa presents today 3-1/2 months status post right shoulder arthroscopy with labral repair, arthroscopic biceps tenodesis, and subacromial decompression performed on 12/30/2024.  I discussed with her that she continues to make improvements in her shoulder.  She does present with some stiffness in abduction external rotation, however I did review home exercises for her to do to help improve this.  She can continue her home exercises as he has transition out of outpatient physical therapy.  I discussed with her that she can gradually begin to return to all overhead activities without restrictions over the next several weeks.  She can continue ice and over-the-counter medication as needed for pain relief.  She can follow-up with me on an as-needed basis.    In regards to her right thumb her blistering and swelling has significantly improved.  She did see Lucien Servin  From OT who demonstrated a home exercise program for her.  She also saw Dr. Flores who recommended continued observation and a thumb pad.  She overall feels this is significantly improved and is not interested in any additional workup at this time.    Conservative treatment:    Ice to shoulder 1-2 times daily, for 20 minutes at a time.  Continue home exercises progressing range of motion.  Gradually return overhead activity over the next several weeks.       Imaging:    All imaging from today was reviewed by myself and explained to the patient.       Injection:    No Injection planned at this time.      Surgery:     No surgery is recommended at this point,  continue with conservative treatment plan as noted.      Follow up:    Return if symptoms worsen or fail to improve.      Chief Complaint   Patient presents with    Right Shoulder - Post-op         History of Present Illness:    The patient is returns for follow up 3 and a half months status post right shoulder arthroscopy with labral repair, arthroscopic biceps tenodesis, and subacromial decompression performed on 2024.  She states that she is overall doing well at this time.  She denies any significant pain or discomfort.  She mentions that she does have some stiffness with making a throwing motion compared to her contralateral side, however she is seeing improvements.  She mentions that she has transitioned out of outpatient physical therapy in regards to both her shoulder and her thumb in the home exercises.  She mentions that she has followed up with Dr. Flores in regards to her right thumb.  She denies any clicking or catching sensation felt in her shoulder.  She denies any new injury or trauma.    Past Medical, Social and Family History:  Past Medical History:   Diagnosis Date    Exercises 5 to 6 times per week     PONV (postoperative nausea and vomiting)     with C Sections//and dizziness    Wears glasses     and contacts     Past Surgical History:   Procedure Laterality Date     SECTION      x2    FL INJECTION RIGHT SHOULDER (ARTHROGRAM)  10/9/2024    NE MASTOPEXY Bilateral 2020    Procedure: BREAST MASTOPEXY;  Surgeon: Kashif Grande MD;  Location: [facility];  Service: Plastics    NE SURGICAL ARTHROSCOPY IVY W/CORACOACRM LIGM RLS Right 12/3/2024    Procedure: ARTHROSCOPIC SUBACROMIAL DECOMPRESSION;  Surgeon: North Concepcion DO;  Location:  MAIN OR;  Service: Orthopedics    NE SURGICAL ARTHROSCOPY SHOULDER CAPSULORRHAPHY Right 12/3/2024    Procedure: REPAIR LABRUM;  Surgeon: North Concepcion DO;  Location: [facility];  Service: Orthopedics    NE TENODESIS LONG TENDON BICEPS Right  "12/3/2024    Procedure: TENODESIS BICEPS OPEN PROXIMAL;  Surgeon: North Concepcion DO;  Location:  MAIN OR;  Service: Orthopedics    WISDOM TOOTH EXTRACTION       Allergies   Allergen Reactions    Bactrim [Sulfamethoxazole-Trimethoprim] Hives     Current Outpatient Medications on File Prior to Visit   Medication Sig Dispense Refill    Multiple Vitamin (MULTI-VITAMIN PO) Take by mouth       No current facility-administered medications on file prior to visit.     Social History     Socioeconomic History    Marital status: /Civil Union     Spouse name: Not on file    Number of children: Not on file    Years of education: Not on file    Highest education level: Not on file   Occupational History    Not on file   Tobacco Use    Smoking status: Never    Smokeless tobacco: Never   Vaping Use    Vaping status: Never Used   Substance and Sexual Activity    Alcohol use: Yes     Comment: minimal    Drug use: Never    Sexual activity: Not on file   Other Topics Concern    Not on file   Social History Narrative    Not on file     Social Drivers of Health     Financial Resource Strain: Not on file   Food Insecurity: Not on file   Transportation Needs: Not on file   Physical Activity: Not on file   Stress: Not on file   Social Connections: Not on file   Intimate Partner Violence: Not on file   Housing Stability: Not on file       I have reviewed the past medical, surgical, social and family history, medications and allergies as documented in the EMR.    Review of systems: ROS is negative other than that noted in the HPI.  Constitutional: Negative for fatigue and fever.      Physical Exam:    Height 5' 1\" (1.549 m), weight 51.3 kg (113 lb), not currently breastfeeding.    General/Constitutional: NAD, well developed, well nourished  HENT: Normocephalic, atraumatic  CV: Intact distal pulses, regular rate  Resp: No respiratory distress or labored breathing  Lymphatic: No lymphadenopathy palpated  Neuro: Alert and Oriented x " 3, no focal deficits  Psych: Normal mood, normal affect, normal judgement, normal behavior  Skin: Warm, dry, no rashes, no erythema      Shoulder focused exam:        Visual inspection of the right shoulder demonstrates normal contour without atrophy.  Healed previous incisions.  Motor and sensory function intact.  Compartments are soft and compressible capillary refill is brisk.  Active range of motion demonstrates 180 degrees of forward elevation, 90 degrees of abduction, 90 degrees of abduction external rotation (100 degrees on the contralateral side), internal rotation to T8, able to reach back of head.  Rotator cuff strength is 5 out of 5 to forward elevation, 5 out of 5 to abduction, 5 out of 5 to external rotation, 5 out of 5 to internal rotation.  Improved and nearly resolved hypersensitivity to touch over the volar aspect of the thumb.  Resolved swelling of the thumb.  Able to actively flex and extend thumb with no stiffness      UE NV Exam: +2 Radial pulses bilaterally  Sensation intact to light touch C5-T1 bilaterally, Radial/median/ulnar nerve motor intact    Cervical ROM is full without pain, numbness or tingling      Shoulder Imaging    No imaging was performed today      Scribe Attestation      I,:  Zachary Alberts am acting as a scribe while in the presence of the attending physician.:       I,:  North Concepcion, DO personally performed the services described in this documentation    as scribed in my presence.:

## (undated) DEVICE — GLOVE SRG BIOGEL 7

## (undated) DEVICE — SURGI KIT INSTRUMENT ORGANIZER

## (undated) DEVICE — TOWEL SURG XR DETECT GREEN STRL RFD

## (undated) DEVICE — BETHLEHEM UNIVERSAL LAPAROTOMY: Brand: CARDINAL HEALTH

## (undated) DEVICE — GLOVE INDICATOR PI UNDERGLOVE SZ 7 BLUE

## (undated) DEVICE — FILTER STRAW 1.7

## (undated) DEVICE — GLOVE SRG BIOGEL 7.5

## (undated) DEVICE — ADHESIVE SKIN HIGH VISCOSITY EXOFIN 1ML

## (undated) DEVICE — CHEST/BREAST DRAPE: Brand: CONVERTORS

## (undated) DEVICE — CANNULA 7 X70MM THRD SEAL SIDE PORT

## (undated) DEVICE — KIT DISP FIBERTAK STRIAGHT SPEAR

## (undated) DEVICE — BURR  OVAL 4MM 13CM 8 FLUTE COOLCUT

## (undated) DEVICE — NEEDLE 25G X 1 1/2

## (undated) DEVICE — SUT MONOCRYL 3-0 PS-2 27 IN Y427H

## (undated) DEVICE — NEEDLE 23G X 1 1/2 SAFETY-GLIDE THIN WALL

## (undated) DEVICE — POSITIONER TRIMANO LIMB BEACH CHAIR

## (undated) DEVICE — SPONGE STICK WITH PVP-I: Brand: KENDALL

## (undated) DEVICE — NEEDLE SPINAL18G X 3.5 IN QUINCKE

## (undated) DEVICE — UNDYED MONOFILAMENT (POLYDIOXANONE), ABSORBABLE SURGICAL SUTURE: Brand: PDS

## (undated) DEVICE — CHLORAPREP HI-LITE 26ML ORANGE

## (undated) DEVICE — TIBURON SPLIT SHEET: Brand: CONVERTORS

## (undated) DEVICE — STANDARD SURGICAL GOWN, L: Brand: CONVERTORS

## (undated) DEVICE — SUT MONOCRYL 3-0 PS-2 18 IN Y497G

## (undated) DEVICE — GLOVE SRG BIOGEL ECLIPSE 7

## (undated) DEVICE — PREP PAD BNS: Brand: CONVERTORS

## (undated) DEVICE — SYRINGE 3ML LL

## (undated) DEVICE — SCD SEQUENTIAL COMPRESSION COMFORT SLEEVE MEDIUM KNEE LENGTH: Brand: KENDALL SCD

## (undated) DEVICE — T-MAX DISPOSABLE FACE MASK 8 PER BOX

## (undated) DEVICE — NEEDLE 18 G X 1 1/2

## (undated) DEVICE — TUBING SUCTION 5MM X 12 FT

## (undated) DEVICE — 3M™ STERI-STRIP™ REINFORCED ADHESIVE SKIN CLOSURES, R1547, 1/2 IN X 4 IN (12 MM X 100 MM), 6 STRIPS/ENVELOPE: Brand: 3M™ STERI-STRIP™

## (undated) DEVICE — TUBING ARTHROSCOPIC WAVE  MAIN PUMP

## (undated) DEVICE — SPONGE SCRUB 4 PCT CHLORHEXIDINE

## (undated) DEVICE — IMPERVIOUS STOCKINETTE: Brand: DEROYAL

## (undated) DEVICE — GLOVE SRG BIOGEL 8

## (undated) DEVICE — PLUMEPEN PRO 10FT

## (undated) DEVICE — KERLIX BANDAGE ROLL: Brand: KERLIX

## (undated) DEVICE — SUT VICRYL 2-0 CT-1 36 IN J945H

## (undated) DEVICE — SKIN MARKER DUAL TIP WITH RULER CAP, FLEXIBLE RULER AND LABELS: Brand: DEVON

## (undated) DEVICE — BLADE SHAVER EXCALIBUR 4MM 13CM COOLCUT

## (undated) DEVICE — VIAL DECANTER

## (undated) DEVICE — DRESSING MEPILEX AG BORDER 4 X 4 IN

## (undated) DEVICE — GLOVE SRG LF STRL BGL SKNSNS 6.5 PF

## (undated) DEVICE — MAT ABSORBANT ARTHROSCOPY FLOOR 46 X 40 IN

## (undated) DEVICE — 3M™ STERI-DRAPE™ U-DRAPE 1015: Brand: STERI-DRAPE™

## (undated) DEVICE — BETHLEHEM UNIV MAJOR ORTHO,KIT: Brand: CARDINAL HEALTH

## (undated) DEVICE — CANNULA DISP 8.25 X 70MM W/SEAL SIDE PORT THRD

## (undated) DEVICE — GLOVE INDICATOR PI UNDERGLOVE SZ 8 BLUE

## (undated) DEVICE — PUDDLE VAC

## (undated) DEVICE — VAPR COOLPULSE 90 ELECTRODE 90 DEGREES SUCTION WITH INTEGRATED HANDPIECE: Brand: VAPR COOLPULSE

## (undated) DEVICE — INTENDED FOR TISSUE SEPARATION, AND OTHER PROCEDURES THAT REQUIRE A SHARP SURGICAL BLADE TO PUNCTURE OR CUT.: Brand: BARD-PARKER ® CARBON RIB-BACK BLADES

## (undated) DEVICE — COBAN 6 IN STERILE

## (undated) DEVICE — NEEDLE SUT SCORPION MEGALOADER